# Patient Record
Sex: MALE | Race: WHITE | NOT HISPANIC OR LATINO | Employment: FULL TIME | URBAN - METROPOLITAN AREA
[De-identification: names, ages, dates, MRNs, and addresses within clinical notes are randomized per-mention and may not be internally consistent; named-entity substitution may affect disease eponyms.]

---

## 2018-03-30 ENCOUNTER — OFFICE VISIT (OUTPATIENT)
Dept: URGENT CARE | Facility: CLINIC | Age: 58
End: 2018-03-30
Payer: COMMERCIAL

## 2018-03-30 VITALS
SYSTOLIC BLOOD PRESSURE: 138 MMHG | RESPIRATION RATE: 16 BRPM | TEMPERATURE: 98.4 F | BODY MASS INDEX: 28.63 KG/M2 | HEART RATE: 84 BPM | HEIGHT: 70 IN | WEIGHT: 200 LBS | DIASTOLIC BLOOD PRESSURE: 78 MMHG

## 2018-03-30 DIAGNOSIS — J11.1 INFLUENZA: Primary | ICD-10-CM

## 2018-03-30 PROCEDURE — 99203 OFFICE O/P NEW LOW 30 MIN: CPT

## 2018-03-30 RX ORDER — OSELTAMIVIR PHOSPHATE 75 MG/1
75 CAPSULE ORAL EVERY 12 HOURS SCHEDULED
Qty: 10 CAPSULE | Refills: 0 | Status: SHIPPED | OUTPATIENT
Start: 2018-03-30 | End: 2018-04-04

## 2018-03-30 RX ORDER — LISINOPRIL 5 MG/1
5 TABLET ORAL DAILY
COMMUNITY

## 2018-03-30 RX ORDER — AMOXICILLIN 500 MG/1
500 CAPSULE ORAL EVERY 8 HOURS SCHEDULED
COMMUNITY
End: 2022-05-19 | Stop reason: HOSPADM

## 2018-03-30 RX ORDER — OSELTAMIVIR PHOSPHATE 75 MG/1
75 CAPSULE ORAL 2 TIMES DAILY
Qty: 10 CAPSULE | Refills: 0 | Status: SHIPPED | OUTPATIENT
Start: 2018-03-30 | End: 2018-03-30 | Stop reason: CLARIF

## 2018-03-30 NOTE — PROGRESS NOTES
[address]        NAME: Ryan Dorsey is a 65 y o  male  : 1960    MRN: [id number]  DATE: 2018  TIME: 1:27 PM    Assessment and Plan   Influenza [J11 1]  1  Influenza  oseltamivir (TAMIFLU) 75 mg capsule         Patient Instructions       Follow up with PCP in 3-5 days  Proceed to  ER if symptoms worsen  Chief Complaint     Chief Complaint   Patient presents with    Nasal Congestion     sinus congestion, coughing, achey, fatigue, fever x 1 week  Pt was seen tuesday at [facility] and was given Amoxicillin  Pt has no improvement  Pt took nightquil last night         History of Present Illness       10 days ago started with sore throat runny nose headache and dry cough with increased fatigue  Was feeling better 5 days ago and then 3-4 days ago had increasing fatigue, body aches, decreased appetite, fever, chills, headache, and dry cough  Has been taking amoxicillin for sinusitis for 3 days and states he feels no better  Denies any sinus pressure or congestion  No max or frontal tenderness  No ear popping  Review of Systems   Review of Systems   Constitutional: Positive for chills, fatigue and fever  HENT: Positive for postnasal drip, rhinorrhea, sneezing and sore throat  Negative for congestion and ear pain  Eyes: Negative  Negative for discharge and redness  Respiratory: Positive for cough  Negative for shortness of breath and wheezing  Cardiovascular: Negative  Negative for chest pain  Gastrointestinal: Negative  Negative for abdominal pain  Endocrine: Negative  Genitourinary: Negative  Musculoskeletal: Positive for myalgias  Negative for gait problem, neck pain and neck stiffness  Skin: Positive for pallor  Negative for color change, rash and wound  Allergic/Immunologic: Negative for environmental allergies  Neurological: Positive for headaches  Negative for dizziness, facial asymmetry and weakness     Psychiatric/Behavioral: Negative  Negative for behavioral problems, confusion and decreased concentration  Current Medications       Current Outpatient Prescriptions:     amoxicillin (AMOXIL) 500 mg capsule, Take 500 mg by mouth every 8 (eight) hours, Disp: , Rfl:     lisinopril (ZESTRIL) 5 mg tablet, Take 5 mg by mouth daily, Disp: , Rfl:     oseltamivir (TAMIFLU) 75 mg capsule, Take 1 capsule (75 mg total) by mouth 2 (two) times a day for 5 days, Disp: 10 capsule, Rfl: 0    Current Allergies     Allergies as of 03/30/2018 - never reviewed   Allergen Reaction Noted    Flagyl [metronidazole] Rash 03/30/2018            The following portions of the patient's history were reviewed and updated as appropriate: allergies, current medications, past family history, past medical history, past social history, past surgical history and problem list      No past medical history on file  No past surgical history on file  No family history on file  Medications have been verified          Objective   /78   Pulse 84   Temp 98 4 °F (36 9 °C)   Resp 16   Ht 5' 10" (1 778 m)   Wt 90 7 kg (200 lb)   BMI 28 70 kg/m²        Physical Exam     Physical Exam

## 2018-03-30 NOTE — PATIENT INSTRUCTIONS
Influenza   WHAT YOU NEED TO KNOW:   Influenza (the flu) is an infection caused by the influenza virus  The flu is easily spread when an infected person coughs, sneezes, or has close contact with others  You may be able to spread the flu to others for 1 week or longer after signs or symptoms appear  DISCHARGE INSTRUCTIONS:   Call 911 for any of the following:   · You have trouble breathing, and your lips look purple or blue  · You have a seizure  Return to the emergency department if:   · You are dizzy, or you are urinating less or not at all  · You have a headache with a stiff neck, and you feel tired or confused  · You have new pain or pressure in your chest     · Your symptoms, such as shortness of breath, vomiting, or diarrhea, get worse  · Your symptoms, such as fever and coughing, seem to get better, but then get worse  Contact your healthcare provider if:   · You have new muscle pain or weakness  · You have questions or concerns about your condition or care  Medicines: You may need any of the following:  · Acetaminophen  decreases pain and fever  It is available without a doctor's order  Ask how much to take and how often to take it  Follow directions  Acetaminophen can cause liver damage if not taken correctly  · NSAIDs , such as ibuprofen, help decrease swelling, pain, and fever  This medicine is available with or without a doctor's order  NSAIDs can cause stomach bleeding or kidney problems in certain people  If you take blood thinner medicine, always ask your healthcare provider if NSAIDs are safe for you  Always read the medicine label and follow directions  · Antivirals  help fight a viral infection  · Take your medicine as directed  Contact your healthcare provider if you think your medicine is not helping or if you have side effects  Tell him or her if you are allergic to any medicine  Keep a list of the medicines, vitamins, and herbs you take   Include the amounts, and when and why you take them  Bring the list or the pill bottles to follow-up visits  Carry your medicine list with you in case of an emergency  Rest  as much as you can to help you recover  Drink liquids as directed  to help prevent dehydration  Ask how much liquid to drink each day and which liquids are best for you  Prevent the spread of influenza:   · Wash your hands often  Use soap and water  Wash your hands after you use the bathroom, change a child's diapers, or sneeze  Wash your hands before you prepare or eat food  Use gel hand cleanser when soap and water are not available  Do not touch your eyes, nose, or mouth unless you have washed your hands first            · Cover your mouth when you sneeze or cough  Cough into a tissue or the bend of your arm  · Clean shared items with a germ-killing   Clean table surfaces, doorknobs, and light switches  Do not share towels, silverware, and dishes with people who are sick  Wash bed sheets, towels, silverware, and dishes with soap and water  · Wear a mask  over your mouth and nose if you are sick or are near anyone who is sick  · Stay away from others  if you are sick  · Influenza vaccine  helps prevent influenza (flu)  Everyone older than 6 months should get a yearly influenza vaccine  Get the vaccine as soon as it is available, usually in September or October each year  Follow up with your healthcare provider as directed:  Write down your questions so you remember to ask them during your visits  © 2017 [address] Information is for End User's use only and may not be sold, redistributed or otherwise used for commercial purposes  All illustrations and images included in CareNotes® are the copyrighted property of [employer]  or [employer]  The above information is an  only  It is not intended as medical advice for individual conditions or treatments   Talk to your doctor, nurse or pharmacist before following any medical regimen to see if it is safe and effective for you

## 2021-06-17 ENCOUNTER — APPOINTMENT (EMERGENCY)
Dept: RADIOLOGY | Facility: HOSPITAL | Age: 61
DRG: 982 | End: 2021-06-17
Payer: COMMERCIAL

## 2021-06-17 ENCOUNTER — DOCUMENTATION (OUTPATIENT)
Dept: NEUROSURGERY | Facility: CLINIC | Age: 61
End: 2021-06-17

## 2021-06-17 ENCOUNTER — APPOINTMENT (INPATIENT)
Dept: RADIOLOGY | Facility: HOSPITAL | Age: 61
DRG: 982 | End: 2021-06-17
Attending: PHYSICIAN ASSISTANT
Payer: COMMERCIAL

## 2021-06-17 ENCOUNTER — APPOINTMENT (INPATIENT)
Dept: RADIOLOGY | Facility: HOSPITAL | Age: 61
DRG: 982 | End: 2021-06-17
Attending: EMERGENCY MEDICINE
Payer: COMMERCIAL

## 2021-06-17 ENCOUNTER — HOSPITAL ENCOUNTER (INPATIENT)
Facility: HOSPITAL | Age: 61
LOS: 5 days | Discharge: HOME HEALTH | DRG: 982 | End: 2021-06-22
Attending: EMERGENCY MEDICINE | Admitting: SURGERY
Payer: COMMERCIAL

## 2021-06-17 DIAGNOSIS — S12.9XXA CLOSED FRACTURE OF MULTIPLE CERVICAL VERTEBRAE, INITIAL ENCOUNTER (CMS/HCC): ICD-10-CM

## 2021-06-17 DIAGNOSIS — S22.009A CLOSED FRACTURE OF MULTIPLE THORACIC VERTEBRAE, INITIAL ENCOUNTER (CMS/HCC): ICD-10-CM

## 2021-06-17 DIAGNOSIS — R11.2 NON-INTRACTABLE VOMITING WITH NAUSEA, UNSPECIFIED VOMITING TYPE: Primary | ICD-10-CM
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
## 2021-06-17 PROBLEM — S27.0XXA TRAUMATIC PNEUMOTHORAX: Status: ACTIVE | Noted: 2021-06-17

## 2021-06-17 PROBLEM — J98.2: Status: ACTIVE | Noted: 2021-06-17

## 2021-06-17 PROBLEM — S22.009D: Status: ACTIVE | Noted: 2021-06-17

## 2021-06-17 PROBLEM — S22.42XD CLOSED FRACTURE OF MULTIPLE RIBS OF LEFT SIDE WITH ROUTINE HEALING: Status: ACTIVE | Noted: 2021-06-17

## 2021-06-17 PROBLEM — S12.601D CLOSED NONDISPLACED FRACTURE OF SEVENTH CERVICAL VERTEBRA WITH ROUTINE HEALING: Status: ACTIVE | Noted: 2021-06-17

## 2021-06-17 LAB
ABO + RH BLD: NORMAL
ALBUMIN SERPL-MCNC: 4.4 G/DL (ref 3.4–5)
ALP SERPL-CCNC: 61 IU/L (ref 35–126)
ALT SERPL-CCNC: 29 IU/L (ref 16–63)
ANION GAP SERPL CALC-SCNC: 10 MEQ/L (ref 3–15)
APTT PPP: 29 SEC (ref 23–35)
AST SERPL-CCNC: 34 IU/L (ref 15–41)
BASOPHILS # BLD: 0.08 K/UL (ref 0.01–0.1)
BASOPHILS NFR BLD: 0.7 %
BILIRUB SERPL-MCNC: 0.9 MG/DL (ref 0.3–1.2)
BLD GP AB SCN SERPL QL: NEGATIVE
BUN SERPL-MCNC: 21 MG/DL (ref 8–20)
CALCIUM SERPL-MCNC: 8.9 MG/DL (ref 8.9–10.3)
CHLORIDE SERPL-SCNC: 101 MEQ/L (ref 98–109)
CO2 SERPL-SCNC: 25 MEQ/L (ref 22–32)
CREAT SERPL-MCNC: 1 MG/DL (ref 0.8–1.3)
D AG BLD QL: NEGATIVE
DIFFERENTIAL METHOD BLD: ABNORMAL
EOSINOPHIL # BLD: 0.26 K/UL (ref 0.04–0.54)
EOSINOPHIL NFR BLD: 2.2 %
ERYTHROCYTE [DISTWIDTH] IN BLOOD BY AUTOMATED COUNT: 12 % (ref 11.6–14.4)
GFR SERPL CREATININE-BSD FRML MDRD: >60 ML/MIN/1.73M*2
GLUCOSE SERPL-MCNC: 172 MG/DL (ref 70–99)
HCT VFR BLDCO AUTO: 46 % (ref 40.1–51)
HGB BLD-MCNC: 15.9 G/DL (ref 13.7–17.5)
IMM GRANULOCYTES # BLD AUTO: 0.15 K/UL (ref 0–0.08)
IMM GRANULOCYTES NFR BLD AUTO: 1.3 %
INR PPP: 1.1
LABORATORY COMMENT REPORT: NORMAL
LIPASE SERPL-CCNC: 31 U/L (ref 20–51)
LYMPHOCYTES # BLD: 3.26 K/UL (ref 1.2–3.5)
LYMPHOCYTES NFR BLD: 28.2 %
MCH RBC QN AUTO: 32 PG (ref 28–33.2)
MCHC RBC AUTO-ENTMCNC: 34.6 G/DL (ref 32.2–36.5)
MCV RBC AUTO: 92.6 FL (ref 83–98)
MONOCYTES # BLD: 0.83 K/UL (ref 0.3–1)
MONOCYTES NFR BLD: 7.2 %
NEUTROPHILS # BLD: 7 K/UL (ref 1.7–7)
NEUTS SEG NFR BLD: 60.4 %
NRBC BLD-RTO: 0 %
PDW BLD AUTO: 9.6 FL (ref 9.4–12.4)
PLATELET # BLD AUTO: 265 K/UL (ref 150–350)
POTASSIUM SERPL-SCNC: 3.9 MEQ/L (ref 3.6–5.1)
PROT SERPL-MCNC: 7.4 G/DL (ref 6–8.2)
PROTHROMBIN TIME: 14.1 SEC (ref 12.2–14.5)
RBC # BLD AUTO: 4.97 M/UL (ref 4.5–5.8)
SARS-COV-2 RNA RESP QL NAA+PROBE: NEGATIVE
SODIUM SERPL-SCNC: 136 MEQ/L (ref 136–144)
WBC # BLD AUTO: 11.58 K/UL (ref 3.8–10.5)

## 2021-06-17 PROCEDURE — 90715 TDAP VACCINE 7 YRS/> IM: CPT

## 2021-06-17 PROCEDURE — 63600105 HC IODINE BASED CONTRAST: Performed by: PHYSICIAN ASSISTANT

## 2021-06-17 PROCEDURE — 25800000 HC PHARMACY IV SOLUTIONS: Performed by: PHYSICIAN ASSISTANT

## 2021-06-17 PROCEDURE — 85730 THROMBOPLASTIN TIME PARTIAL: CPT | Performed by: EMERGENCY MEDICINE

## 2021-06-17 PROCEDURE — 63600105 HC IODINE BASED CONTRAST

## 2021-06-17 PROCEDURE — 85610 PROTHROMBIN TIME: CPT | Performed by: EMERGENCY MEDICINE

## 2021-06-17 PROCEDURE — 71045 X-RAY EXAM CHEST 1 VIEW: CPT

## 2021-06-17 PROCEDURE — G1004 CDSM NDSC: HCPCS

## 2021-06-17 PROCEDURE — 70450 CT HEAD/BRAIN W/O DYE: CPT | Mod: MG

## 2021-06-17 PROCEDURE — 73090 X-RAY EXAM OF FOREARM: CPT | Mod: RT

## 2021-06-17 PROCEDURE — 85610 PROTHROMBIN TIME: CPT

## 2021-06-17 PROCEDURE — 85025 COMPLETE CBC W/AUTO DIFF WBC: CPT

## 2021-06-17 PROCEDURE — 20600000 HC ROOM AND CARE INTERMEDIATE/TELEMETRY

## 2021-06-17 PROCEDURE — 99222 1ST HOSP IP/OBS MODERATE 55: CPT | Mod: 57 | Performed by: NEUROLOGICAL SURGERY

## 2021-06-17 PROCEDURE — 36415 COLL VENOUS BLD VENIPUNCTURE: CPT

## 2021-06-17 PROCEDURE — 63700000 HC SELF-ADMINISTRABLE DRUG: Performed by: PHYSICIAN ASSISTANT

## 2021-06-17 PROCEDURE — 99285 EMERGENCY DEPT VISIT HI MDM: CPT | Mod: 25

## 2021-06-17 PROCEDURE — 72146 MRI CHEST SPINE W/O DYE: CPT | Mod: MG

## 2021-06-17 PROCEDURE — 63600000 HC DRUGS/DETAIL CODE: Performed by: SURGERY

## 2021-06-17 PROCEDURE — 83690 ASSAY OF LIPASE: CPT | Performed by: EMERGENCY MEDICINE

## 2021-06-17 PROCEDURE — 90471 IMMUNIZATION ADMIN: CPT

## 2021-06-17 PROCEDURE — 68200000 HC TRAUMA RSPNS W/O CRIT CARE

## 2021-06-17 PROCEDURE — 96374 THER/PROPH/DIAG INJ IV PUSH: CPT | Mod: 59

## 2021-06-17 PROCEDURE — U0003 INFECTIOUS AGENT DETECTION BY NUCLEIC ACID (DNA OR RNA); SEVERE ACUTE RESPIRATORY SYNDROME CORONAVIRUS 2 (SARS-COV-2) (CORONAVIRUS DISEASE [COVID-19]), AMPLIFIED PROBE TECHNIQUE, MAKING USE OF HIGH THROUGHPUT TECHNOLOGIES AS DESCRIBED BY CMS-2020-01-R: HCPCS | Performed by: EMERGENCY MEDICINE

## 2021-06-17 PROCEDURE — 63600000 HC DRUGS/DETAIL CODE: Performed by: PHYSICIAN ASSISTANT

## 2021-06-17 PROCEDURE — 80053 COMPREHEN METABOLIC PANEL: CPT | Performed by: EMERGENCY MEDICINE

## 2021-06-17 PROCEDURE — 85025 COMPLETE CBC W/AUTO DIFF WBC: CPT | Performed by: EMERGENCY MEDICINE

## 2021-06-17 PROCEDURE — 74220 X-RAY XM ESOPHAGUS 1CNTRST: CPT

## 2021-06-17 PROCEDURE — 63700000 HC SELF-ADMINISTRABLE DRUG: Performed by: NEUROLOGICAL SURGERY

## 2021-06-17 PROCEDURE — 72170 X-RAY EXAM OF PELVIS: CPT

## 2021-06-17 PROCEDURE — 86901 BLOOD TYPING SEROLOGIC RH(D): CPT

## 2021-06-17 PROCEDURE — 85730 THROMBOPLASTIN TIME PARTIAL: CPT

## 2021-06-17 PROCEDURE — 73130 X-RAY EXAM OF HAND: CPT | Mod: RT

## 2021-06-17 PROCEDURE — 63600000 HC DRUGS/DETAIL CODE: Performed by: NEUROLOGICAL SURGERY

## 2021-06-17 PROCEDURE — 90472 IMMUNIZATION ADMIN EACH ADD: CPT | Performed by: EMERGENCY MEDICINE

## 2021-06-17 PROCEDURE — 70030 X-RAY EYE FOR FOREIGN BODY: CPT | Mod: 50

## 2021-06-17 PROCEDURE — 63600000 HC DRUGS/DETAIL CODE

## 2021-06-17 PROCEDURE — 73564 X-RAY EXAM KNEE 4 OR MORE: CPT | Mod: 50

## 2021-06-17 PROCEDURE — 74177 CT ABD & PELVIS W/CONTRAST: CPT | Mod: MG

## 2021-06-17 RX ORDER — KETOROLAC TROMETHAMINE 30 MG/ML
30 INJECTION, SOLUTION INTRAMUSCULAR; INTRAVENOUS EVERY 8 HOURS
Status: COMPLETED | OUTPATIENT
Start: 2021-06-17 | End: 2021-06-22

## 2021-06-17 RX ORDER — POTASSIUM CHLORIDE 14.9 MG/ML
20 INJECTION INTRAVENOUS AS NEEDED
Status: DISCONTINUED | OUTPATIENT
Start: 2021-06-17 | End: 2021-06-22 | Stop reason: HOSPADM

## 2021-06-17 RX ORDER — ACETAMINOPHEN 325 MG/1
975 TABLET ORAL EVERY 6 HOURS
Status: DISCONTINUED | OUTPATIENT
Start: 2021-06-17 | End: 2021-06-22 | Stop reason: HOSPADM

## 2021-06-17 RX ORDER — LISINOPRIL 5 MG/1
5 TABLET ORAL DAILY
COMMUNITY

## 2021-06-17 RX ORDER — SODIUM CHLORIDE 9 MG/ML
INJECTION, SOLUTION INTRAVENOUS CONTINUOUS
Status: DISCONTINUED | OUTPATIENT
Start: 2021-06-17 | End: 2021-06-20

## 2021-06-17 RX ORDER — IBUPROFEN 200 MG
16-32 TABLET ORAL AS NEEDED
Status: DISCONTINUED | OUTPATIENT
Start: 2021-06-17 | End: 2021-06-22 | Stop reason: HOSPADM

## 2021-06-17 RX ORDER — POTASSIUM CHLORIDE 750 MG/1
20 TABLET, FILM COATED, EXTENDED RELEASE ORAL AS NEEDED
Status: DISCONTINUED | OUTPATIENT
Start: 2021-06-17 | End: 2021-06-22 | Stop reason: HOSPADM

## 2021-06-17 RX ORDER — DIAZEPAM 5 MG/1
5 TABLET ORAL EVERY 6 HOURS PRN
Status: DISCONTINUED | OUTPATIENT
Start: 2021-06-17 | End: 2021-06-19

## 2021-06-17 RX ORDER — DEXTROSE 50 % IN WATER (D50W) INTRAVENOUS SYRINGE
25 AS NEEDED
Status: DISCONTINUED | OUTPATIENT
Start: 2021-06-17 | End: 2021-06-22 | Stop reason: HOSPADM

## 2021-06-17 RX ORDER — OXYCODONE HYDROCHLORIDE 5 MG/1
10 TABLET ORAL EVERY 4 HOURS PRN
Status: DISCONTINUED | OUTPATIENT
Start: 2021-06-17 | End: 2021-06-22 | Stop reason: HOSPADM

## 2021-06-17 RX ORDER — AMOXICILLIN 250 MG
1 CAPSULE ORAL 2 TIMES DAILY
Status: DISCONTINUED | OUTPATIENT
Start: 2021-06-17 | End: 2021-06-22 | Stop reason: HOSPADM

## 2021-06-17 RX ORDER — HYDROMORPHONE HYDROCHLORIDE 1 MG/ML
0.5 INJECTION, SOLUTION INTRAMUSCULAR; INTRAVENOUS; SUBCUTANEOUS
Status: DISCONTINUED | OUTPATIENT
Start: 2021-06-17 | End: 2021-06-17

## 2021-06-17 RX ORDER — OXYCODONE HYDROCHLORIDE 5 MG/1
15 TABLET ORAL EVERY 4 HOURS PRN
Status: DISCONTINUED | OUTPATIENT
Start: 2021-06-17 | End: 2021-06-22 | Stop reason: HOSPADM

## 2021-06-17 RX ORDER — POTASSIUM CHLORIDE 750 MG/1
40 TABLET, FILM COATED, EXTENDED RELEASE ORAL AS NEEDED
Status: DISCONTINUED | OUTPATIENT
Start: 2021-06-17 | End: 2021-06-22 | Stop reason: HOSPADM

## 2021-06-17 RX ORDER — HYDROMORPHONE HYDROCHLORIDE 1 MG/ML
1 INJECTION, SOLUTION INTRAMUSCULAR; INTRAVENOUS; SUBCUTANEOUS EVERY 2 HOUR PRN
Status: DISCONTINUED | OUTPATIENT
Start: 2021-06-17 | End: 2021-06-22 | Stop reason: HOSPADM

## 2021-06-17 RX ORDER — TAMSULOSIN HYDROCHLORIDE 0.4 MG/1
0.4 CAPSULE ORAL NIGHTLY
Status: DISCONTINUED | OUTPATIENT
Start: 2021-06-17 | End: 2021-06-22 | Stop reason: HOSPADM

## 2021-06-17 RX ORDER — ENOXAPARIN SODIUM 100 MG/ML
30 INJECTION SUBCUTANEOUS
Status: DISCONTINUED | OUTPATIENT
Start: 2021-06-17 | End: 2021-06-17

## 2021-06-17 RX ORDER — FENTANYL CITRATE 50 UG/ML
INJECTION, SOLUTION INTRAMUSCULAR; INTRAVENOUS
Status: COMPLETED | OUTPATIENT
Start: 2021-06-17 | End: 2021-06-17

## 2021-06-17 RX ORDER — LISINOPRIL 5 MG/1
5 TABLET ORAL DAILY
Status: DISCONTINUED | OUTPATIENT
Start: 2021-06-18 | End: 2021-06-22 | Stop reason: HOSPADM

## 2021-06-17 RX ORDER — DEXTROSE 40 %
15-30 GEL (GRAM) ORAL AS NEEDED
Status: DISCONTINUED | OUTPATIENT
Start: 2021-06-17 | End: 2021-06-22 | Stop reason: HOSPADM

## 2021-06-17 RX ADMIN — KETOROLAC TROMETHAMINE 30 MG: 30 INJECTION, SOLUTION INTRAMUSCULAR; INTRAVENOUS at 22:17

## 2021-06-17 RX ADMIN — IOHEXOL 125 ML: 300 INJECTION, SOLUTION INTRAVENOUS at 08:28

## 2021-06-17 RX ADMIN — FENTANYL CITRATE 100 MCG: 50 INJECTION INTRAMUSCULAR; INTRAVENOUS at 08:07

## 2021-06-17 RX ADMIN — HYDROMORPHONE HYDROCHLORIDE 0.5 MG: 1 INJECTION, SOLUTION INTRAMUSCULAR; INTRAVENOUS; SUBCUTANEOUS at 10:29

## 2021-06-17 RX ADMIN — ACETAMINOPHEN 975 MG: 325 TABLET, FILM COATED ORAL at 23:50

## 2021-06-17 RX ADMIN — HYDROMORPHONE HYDROCHLORIDE 0.5 MG: 1 INJECTION, SOLUTION INTRAMUSCULAR; INTRAVENOUS; SUBCUTANEOUS at 15:48

## 2021-06-17 RX ADMIN — ENOXAPARIN SODIUM 30 MG: 100 INJECTION SUBCUTANEOUS at 17:42

## 2021-06-17 RX ADMIN — DOCUSATE SODIUM AND SENNOSIDES 1 TABLET: 50; 8.6 TABLET ORAL at 20:18

## 2021-06-17 RX ADMIN — SODIUM CHLORIDE: 9 INJECTION, SOLUTION INTRAVENOUS at 17:22

## 2021-06-17 RX ADMIN — IOHEXOL 100 ML: 300 INJECTION, SOLUTION INTRAVENOUS at 16:46

## 2021-06-17 RX ADMIN — TAMSULOSIN HYDROCHLORIDE 0.4 MG: 0.4 CAPSULE ORAL at 20:18

## 2021-06-17 RX ADMIN — TETANUS TOXOID, REDUCED DIPHTHERIA TOXOID AND ACELLULAR PERTUSSIS VACCINE, ADSORBED 0.5 ML: 5; 2.5; 8; 8; 2.5 SUSPENSION INTRAMUSCULAR at 09:28

## 2021-06-17 RX ADMIN — HYDROMORPHONE HYDROCHLORIDE 1 MG: 1 INJECTION, SOLUTION INTRAMUSCULAR; INTRAVENOUS; SUBCUTANEOUS at 20:18

## 2021-06-17 RX ADMIN — ACETAMINOPHEN 975 MG: 325 TABLET, FILM COATED ORAL at 17:41

## 2021-06-17 RX ADMIN — DIAZEPAM 5 MG: 5 TABLET ORAL at 20:18

## 2021-06-17 ASSESSMENT — ENCOUNTER SYMPTOMS
CONSTITUTIONAL NEGATIVE: 1
HEMATOLOGIC/LYMPHATIC NEGATIVE: 1
DYSURIA: 0
NAUSEA: 0
LIGHT-HEADEDNESS: 0
ARTHRALGIAS: 0
COUGH: 0
WEAKNESS: 0
FLANK PAIN: 0
NEUROLOGICAL NEGATIVE: 1
FEVER: 0
MUSCULOSKELETAL NEGATIVE: 1
RESPIRATORY NEGATIVE: 1
CARDIOVASCULAR NEGATIVE: 1
NUMBNESS: 0
SHORTNESS OF BREATH: 0
MYALGIAS: 0
CHILLS: 0
BRUISES/BLEEDS EASILY: 0
ENDOCRINE NEGATIVE: 1
EYES NEGATIVE: 1
PALPITATIONS: 0
VOMITING: 0
RHINORRHEA: 0
ABDOMINAL PAIN: 0
DIZZINESS: 0
GASTROINTESTINAL NEGATIVE: 1

## 2021-06-17 ASSESSMENT — COGNITIVE AND FUNCTIONAL STATUS - GENERAL
HELP NEEDED FOR PERSONAL GROOMING: 2 - A LOT
CLIMB 3 TO 5 STEPS WITH RAILING: 1 - TOTAL
DRESSING REGULAR UPPER BODY CLOTHING: 2 - A LOT
EATING MEALS: 2 - A LOT
TOILETING: 2 - A LOT
HELP NEEDED FOR BATHING: 2 - A LOT
DRESSING REGULAR LOWER BODY CLOTHING: 2 - A LOT
STANDING UP FROM CHAIR USING ARMS: 1 - TOTAL
MOVING TO AND FROM BED TO CHAIR: 1 - TOTAL
WALKING IN HOSPITAL ROOM: 1 - TOTAL

## 2021-06-17 ASSESSMENT — PATIENT HEALTH QUESTIONNAIRE - PHQ9: SUM OF ALL RESPONSES TO PHQ9 QUESTIONS 1 & 2: 0

## 2021-06-17 NOTE — ASSESSMENT & PLAN NOTE
Impression: Other vitreous opacities, bilateral Plan: Pt notes cloud like appearance and floaters OU intermittently and feels this has worsened over past 6 mos. Retina exam appears stable. Signs and symptoms of RD reviewed. RTC STAT if any increased in floaters or loss of VA. See mac oct for findings. Discussed PPVIT briefly. Recommend consult w Retina Team to discuss further and if pt is a good candidate. Small post traumatic R hemo/pnx  6/18 - no pneumo / pleural effusion appreciated on x ray   cxr this am

## 2021-06-17 NOTE — ASSESSMENT & PLAN NOTE
Spine injury     MRI -    1.  Redemonstration of a comminuted fracture extending to the right lamina, facet and transverse process of C7.  The fracture extends to both the superiorand inferior articular facets of C7.  2.  Small epidural hematoma extending from the level of C5-C6 to the level of C7-T1.  3.  Edema in the interspinous ligaments at C3-C4, C5-C6, T1-T2, T2-T3 and atT8-T9.  4.  Fracture through the superior endplate of T9 extending into the T8-T9  intervertebral disc.  Mild widening of the T8-T9 intervertebral disc space anteriorly associated focal disruption of the anterior longitudinal ligament.  5.  Mild paravertebral soft tissue edema.  6.  Additional chronic and incidental findings, as above.
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
## 2021-06-18 ENCOUNTER — APPOINTMENT (INPATIENT)
Dept: RADIOLOGY | Facility: HOSPITAL | Age: 61
DRG: 982 | End: 2021-06-18
Attending: PHYSICIAN ASSISTANT
Payer: COMMERCIAL

## 2021-06-18 ENCOUNTER — APPOINTMENT (INPATIENT)
Dept: RADIOLOGY | Facility: HOSPITAL | Age: 61
DRG: 982 | End: 2021-06-18
Attending: NEUROLOGICAL SURGERY
Payer: COMMERCIAL

## 2021-06-18 ENCOUNTER — ANESTHESIA (INPATIENT)
Dept: OPERATING ROOM | Facility: HOSPITAL | Age: 61
DRG: 982 | End: 2021-06-18
Payer: COMMERCIAL

## 2021-06-18 ENCOUNTER — ANESTHESIA EVENT (INPATIENT)
Dept: OPERATING ROOM | Facility: HOSPITAL | Age: 61
DRG: 982 | End: 2021-06-18
Payer: COMMERCIAL

## 2021-06-18 LAB
ABO + RH BLD: NORMAL
ANION GAP SERPL CALC-SCNC: 11 MEQ/L (ref 3–15)
BUN SERPL-MCNC: 23 MG/DL (ref 8–20)
CALCIUM SERPL-MCNC: 8.9 MG/DL (ref 8.9–10.3)
CHLORIDE SERPL-SCNC: 101 MEQ/L (ref 98–109)
CO2 SERPL-SCNC: 24 MEQ/L (ref 22–32)
CREAT SERPL-MCNC: 1 MG/DL (ref 0.8–1.3)
D AG BLD QL: NEGATIVE
ERYTHROCYTE [DISTWIDTH] IN BLOOD BY AUTOMATED COUNT: 12.5 % (ref 11.6–14.4)
GFR SERPL CREATININE-BSD FRML MDRD: >60 ML/MIN/1.73M*2
GLUCOSE SERPL-MCNC: 95 MG/DL (ref 70–99)
HCT VFR BLDCO AUTO: 41.2 % (ref 40.1–51)
HGB BLD-MCNC: 14.5 G/DL (ref 13.7–17.5)
LABORATORY COMMENT REPORT: NORMAL
MAGNESIUM SERPL-MCNC: 2.2 MG/DL (ref 1.8–2.5)
MCH RBC QN AUTO: 33.2 PG (ref 28–33.2)
MCHC RBC AUTO-ENTMCNC: 35.2 G/DL (ref 32.2–36.5)
MCV RBC AUTO: 94.3 FL (ref 83–98)
PDW BLD AUTO: 9.6 FL (ref 9.4–12.4)
PLATELET # BLD AUTO: 202 K/UL (ref 150–350)
POTASSIUM SERPL-SCNC: 4.1 MEQ/L (ref 3.6–5.1)
RBC # BLD AUTO: 4.37 M/UL (ref 4.5–5.8)
SODIUM SERPL-SCNC: 136 MEQ/L (ref 136–144)
WBC # BLD AUTO: 11.96 K/UL (ref 3.8–10.5)

## 2021-06-18 PROCEDURE — 25800000 HC PHARMACY IV SOLUTIONS: Performed by: NEUROLOGICAL SURGERY

## 2021-06-18 PROCEDURE — 63700000 HC SELF-ADMINISTRABLE DRUG: Performed by: NEUROLOGICAL SURGERY

## 2021-06-18 PROCEDURE — 25000000 HC PHARMACY GENERAL: Performed by: NURSE ANESTHETIST, CERTIFIED REGISTERED

## 2021-06-18 PROCEDURE — 22610 ARTHRD PST TQ 1NTRSPC THRC: CPT | Performed by: NEUROLOGICAL SURGERY

## 2021-06-18 PROCEDURE — 22327 TREAT THORAX SPINE FRACTURE: CPT | Performed by: NEUROLOGICAL SURGERY

## 2021-06-18 PROCEDURE — 36415 COLL VENOUS BLD VENIPUNCTURE: CPT | Performed by: PHYSICIAN ASSISTANT

## 2021-06-18 PROCEDURE — 20600000 HC ROOM AND CARE INTERMEDIATE/TELEMETRY

## 2021-06-18 PROCEDURE — 0RG7071 FUSION OF 2 TO 7 THORACIC VERTEBRAL JOINTS WITH AUTOLOGOUS TISSUE SUBSTITUTE, POSTERIOR APPROACH, POSTERIOR COLUMN, OPEN APPROACH: ICD-10-PCS | Performed by: NEUROLOGICAL SURGERY

## 2021-06-18 PROCEDURE — 22614 ARTHRD PST TQ 1NTRSPC EA ADD: CPT | Mod: AS | Performed by: PHYSICIAN ASSISTANT

## 2021-06-18 PROCEDURE — 71000001 HC PACU PHASE 1 INITIAL 30MIN: Performed by: NEUROLOGICAL SURGERY

## 2021-06-18 PROCEDURE — 0PS404Z REPOSITION THORACIC VERTEBRA WITH INTERNAL FIXATION DEVICE, OPEN APPROACH: ICD-10-PCS | Performed by: NEUROLOGICAL SURGERY

## 2021-06-18 PROCEDURE — 27800000 HC SUPPLY/IMPLANTS: Performed by: NEUROLOGICAL SURGERY

## 2021-06-18 PROCEDURE — 63600000 HC DRUGS/DETAIL CODE: Performed by: NURSE ANESTHETIST, CERTIFIED REGISTERED

## 2021-06-18 PROCEDURE — 25800000 HC PHARMACY IV SOLUTIONS: Performed by: ANESTHESIOLOGY

## 2021-06-18 PROCEDURE — 20936 SP BONE AGRFT LOCAL ADD-ON: CPT | Performed by: NEUROLOGICAL SURGERY

## 2021-06-18 PROCEDURE — 25000000 HC PHARMACY GENERAL: Performed by: NEUROLOGICAL SURGERY

## 2021-06-18 PROCEDURE — 63700000 HC SELF-ADMINISTRABLE DRUG: Performed by: PHYSICIAN ASSISTANT

## 2021-06-18 PROCEDURE — 22842 INSERT SPINE FIXATION DEVICE: CPT | Mod: AS | Performed by: PHYSICIAN ASSISTANT

## 2021-06-18 PROCEDURE — 22327 TREAT THORAX SPINE FRACTURE: CPT | Mod: AS | Performed by: PHYSICIAN ASSISTANT

## 2021-06-18 PROCEDURE — 22610 ARTHRD PST TQ 1NTRSPC THRC: CPT | Mod: AS | Performed by: PHYSICIAN ASSISTANT

## 2021-06-18 PROCEDURE — 37000001 HC ANESTHESIA GENERAL: Performed by: NEUROLOGICAL SURGERY

## 2021-06-18 PROCEDURE — 63600000 HC DRUGS/DETAIL CODE: Performed by: ANESTHESIOLOGY

## 2021-06-18 PROCEDURE — 25000000 HC PHARMACY GENERAL: Performed by: ANESTHESIOLOGY

## 2021-06-18 PROCEDURE — 83735 ASSAY OF MAGNESIUM: CPT | Performed by: PHYSICIAN ASSISTANT

## 2021-06-18 PROCEDURE — 63600000 HC DRUGS/DETAIL CODE: Performed by: NEUROLOGICAL SURGERY

## 2021-06-18 PROCEDURE — 20930 SP BONE ALGRFT MORSEL ADD-ON: CPT | Performed by: NEUROLOGICAL SURGERY

## 2021-06-18 PROCEDURE — 85027 COMPLETE CBC AUTOMATED: CPT | Performed by: PHYSICIAN ASSISTANT

## 2021-06-18 PROCEDURE — 61783 SCAN PROC SPINAL: CPT | Performed by: NEUROLOGICAL SURGERY

## 2021-06-18 PROCEDURE — C1713 ANCHOR/SCREW BN/BN,TIS/BN: HCPCS | Performed by: NEUROLOGICAL SURGERY

## 2021-06-18 PROCEDURE — 27200000 HC STERILE SUPPLY: Performed by: NEUROLOGICAL SURGERY

## 2021-06-18 PROCEDURE — 22842 INSERT SPINE FIXATION DEVICE: CPT | Performed by: NEUROLOGICAL SURGERY

## 2021-06-18 PROCEDURE — 36000014 HC OR LEVEL 4 EA ADDL MIN: Performed by: NEUROLOGICAL SURGERY

## 2021-06-18 PROCEDURE — 22614 ARTHRD PST TQ 1NTRSPC EA ADD: CPT | Performed by: NEUROLOGICAL SURGERY

## 2021-06-18 PROCEDURE — 25800000 HC PHARMACY IV SOLUTIONS: Performed by: NURSE ANESTHETIST, CERTIFIED REGISTERED

## 2021-06-18 PROCEDURE — 73120 X-RAY EXAM OF HAND: CPT | Mod: LT

## 2021-06-18 PROCEDURE — 72070 X-RAY EXAM THORAC SPINE 2VWS: CPT

## 2021-06-18 PROCEDURE — 80048 BASIC METABOLIC PNL TOTAL CA: CPT | Performed by: PHYSICIAN ASSISTANT

## 2021-06-18 PROCEDURE — 71000011 HC PACU PHASE 1 EA ADDL MIN: Performed by: NEUROLOGICAL SURGERY

## 2021-06-18 PROCEDURE — 63600000 HC DRUGS/DETAIL CODE: Mod: JW | Performed by: NURSE ANESTHETIST, CERTIFIED REGISTERED

## 2021-06-18 PROCEDURE — 36000004 HC OR LEVEL 4 INITIAL 30MIN: Performed by: NEUROLOGICAL SURGERY

## 2021-06-18 DEVICE — PASTE GRAFTON PLUS 10CC: Type: IMPLANTABLE DEVICE | Site: SPINE LUMBAR | Status: FUNCTIONAL

## 2021-06-18 DEVICE — SET SCREWS EXPEDIUM: Type: IMPLANTABLE DEVICE | Site: SPINE LUMBAR | Status: FUNCTIONAL

## 2021-06-18 DEVICE — BONE CANC CRUSHED 15CC (1-10MM) FREEZE DRIED: Type: IMPLANTABLE DEVICE | Site: SPINE LUMBAR | Status: FUNCTIONAL

## 2021-06-18 DEVICE — IMPLANTABLE DEVICE: Type: IMPLANTABLE DEVICE | Site: SPINE LUMBAR | Status: FUNCTIONAL

## 2021-06-18 DEVICE — SCREW POLY 5 X 45: Type: IMPLANTABLE DEVICE | Site: SPINE LUMBAR | Status: FUNCTIONAL

## 2021-06-18 DEVICE — ROD EXPEDIUM 115MM: Type: IMPLANTABLE DEVICE | Site: SPINE LUMBAR | Status: FUNCTIONAL

## 2021-06-18 DEVICE — MASTERGRAFT MATRIX 20CC: Type: IMPLANTABLE DEVICE | Site: SPINE LUMBAR | Status: FUNCTIONAL

## 2021-06-18 DEVICE — INFUSE BONE GRAFT MEDIUM KIT: Type: IMPLANTABLE DEVICE | Site: SPINE LUMBAR | Status: FUNCTIONAL

## 2021-06-18 DEVICE — SCREW EXPEDIUM 6 X 45MM: Type: IMPLANTABLE DEVICE | Site: SPINE LUMBAR | Status: FUNCTIONAL

## 2021-06-18 RX ORDER — DEXTROSE 40 %
15-30 GEL (GRAM) ORAL AS NEEDED
Status: DISCONTINUED | OUTPATIENT
Start: 2021-06-18 | End: 2021-06-18 | Stop reason: HOSPADM

## 2021-06-18 RX ORDER — FENTANYL CITRATE 50 UG/ML
INJECTION, SOLUTION INTRAMUSCULAR; INTRAVENOUS AS NEEDED
Status: DISCONTINUED | OUTPATIENT
Start: 2021-06-18 | End: 2021-06-18 | Stop reason: SURG

## 2021-06-18 RX ORDER — PROPOFOL 10 MG/ML
INJECTION, EMULSION INTRAVENOUS CONTINUOUS PRN
Status: DISCONTINUED | OUTPATIENT
Start: 2021-06-18 | End: 2021-06-18 | Stop reason: SURG

## 2021-06-18 RX ORDER — ONDANSETRON HYDROCHLORIDE 2 MG/ML
INJECTION, SOLUTION INTRAVENOUS AS NEEDED
Status: DISCONTINUED | OUTPATIENT
Start: 2021-06-18 | End: 2021-06-18 | Stop reason: SURG

## 2021-06-18 RX ORDER — HYDROMORPHONE HYDROCHLORIDE 1 MG/ML
0.5 INJECTION, SOLUTION INTRAMUSCULAR; INTRAVENOUS; SUBCUTANEOUS
Status: DISCONTINUED | OUTPATIENT
Start: 2021-06-18 | End: 2021-06-18 | Stop reason: HOSPADM

## 2021-06-18 RX ORDER — VANCOMYCIN HYDROCHLORIDE 1 G/20ML
INJECTION, POWDER, LYOPHILIZED, FOR SOLUTION INTRAVENOUS AS NEEDED
Status: DISCONTINUED | OUTPATIENT
Start: 2021-06-18 | End: 2021-06-18 | Stop reason: HOSPADM

## 2021-06-18 RX ORDER — IBUPROFEN 200 MG
16-32 TABLET ORAL AS NEEDED
Status: DISCONTINUED | OUTPATIENT
Start: 2021-06-18 | End: 2021-06-18 | Stop reason: HOSPADM

## 2021-06-18 RX ORDER — ROCURONIUM BROMIDE 10 MG/ML
INJECTION, SOLUTION INTRAVENOUS AS NEEDED
Status: DISCONTINUED | OUTPATIENT
Start: 2021-06-18 | End: 2021-06-18 | Stop reason: SURG

## 2021-06-18 RX ORDER — PHENYLEPHRINE HCL IN 0.9% NACL 50MG/250ML
PLASTIC BAG, INJECTION (ML) INTRAVENOUS CONTINUOUS PRN
Status: DISCONTINUED | OUTPATIENT
Start: 2021-06-18 | End: 2021-06-18 | Stop reason: SURG

## 2021-06-18 RX ORDER — HYDROMORPHONE HYDROCHLORIDE 1 MG/ML
INJECTION, SOLUTION INTRAMUSCULAR; INTRAVENOUS; SUBCUTANEOUS AS NEEDED
Status: DISCONTINUED | OUTPATIENT
Start: 2021-06-18 | End: 2021-06-18 | Stop reason: SURG

## 2021-06-18 RX ORDER — FENTANYL CITRATE 50 UG/ML
50 INJECTION, SOLUTION INTRAMUSCULAR; INTRAVENOUS
Status: DISCONTINUED | OUTPATIENT
Start: 2021-06-18 | End: 2021-06-18 | Stop reason: HOSPADM

## 2021-06-18 RX ORDER — ONDANSETRON HYDROCHLORIDE 2 MG/ML
4 INJECTION, SOLUTION INTRAVENOUS
Status: DISCONTINUED | OUTPATIENT
Start: 2021-06-18 | End: 2021-06-18 | Stop reason: HOSPADM

## 2021-06-18 RX ORDER — PROPOFOL 10 MG/ML
INJECTION, EMULSION INTRAVENOUS AS NEEDED
Status: DISCONTINUED | OUTPATIENT
Start: 2021-06-18 | End: 2021-06-18 | Stop reason: SURG

## 2021-06-18 RX ORDER — BUPIVACAINE HCL/EPINEPHRINE 0.5-1:200K
VIAL (ML) INJECTION AS NEEDED
Status: DISCONTINUED | OUTPATIENT
Start: 2021-06-18 | End: 2021-06-18 | Stop reason: HOSPADM

## 2021-06-18 RX ORDER — MIDAZOLAM HYDROCHLORIDE 2 MG/2ML
INJECTION, SOLUTION INTRAMUSCULAR; INTRAVENOUS AS NEEDED
Status: DISCONTINUED | OUTPATIENT
Start: 2021-06-18 | End: 2021-06-18 | Stop reason: SURG

## 2021-06-18 RX ORDER — ACETAMINOPHEN 325 MG/1
650 TABLET ORAL ONCE AS NEEDED
Status: DISCONTINUED | OUTPATIENT
Start: 2021-06-18 | End: 2021-06-18 | Stop reason: HOSPADM

## 2021-06-18 RX ORDER — LIDOCAINE HYDROCHLORIDE 10 MG/ML
INJECTION, SOLUTION INFILTRATION; PERINEURAL AS NEEDED
Status: DISCONTINUED | OUTPATIENT
Start: 2021-06-18 | End: 2021-06-18 | Stop reason: SURG

## 2021-06-18 RX ORDER — SODIUM CHLORIDE, SODIUM GLUCONATE, SODIUM ACETATE, POTASSIUM CHLORIDE AND MAGNESIUM CHLORIDE 30; 37; 368; 526; 502 MG/100ML; MG/100ML; MG/100ML; MG/100ML; MG/100ML
INJECTION, SOLUTION INTRAVENOUS CONTINUOUS PRN
Status: DISCONTINUED | OUTPATIENT
Start: 2021-06-18 | End: 2021-06-18 | Stop reason: SURG

## 2021-06-18 RX ORDER — DEXTROSE 50 % IN WATER (D50W) INTRAVENOUS SYRINGE
25 AS NEEDED
Status: DISCONTINUED | OUTPATIENT
Start: 2021-06-18 | End: 2021-06-18 | Stop reason: HOSPADM

## 2021-06-18 RX ORDER — DEXAMETHASONE SODIUM PHOSPHATE 4 MG/ML
INJECTION, SOLUTION INTRA-ARTICULAR; INTRALESIONAL; INTRAMUSCULAR; INTRAVENOUS; SOFT TISSUE AS NEEDED
Status: DISCONTINUED | OUTPATIENT
Start: 2021-06-18 | End: 2021-06-18 | Stop reason: SURG

## 2021-06-18 RX ORDER — MEPERIDINE HYDROCHLORIDE 25 MG/ML
12.5 INJECTION INTRAMUSCULAR; INTRAVENOUS; SUBCUTANEOUS EVERY 10 MIN PRN
Status: DISCONTINUED | OUTPATIENT
Start: 2021-06-18 | End: 2021-06-22 | Stop reason: HOSPADM

## 2021-06-18 RX ORDER — OXYCODONE HYDROCHLORIDE 5 MG/1
5 TABLET ORAL ONCE AS NEEDED
Status: DISCONTINUED | OUTPATIENT
Start: 2021-06-18 | End: 2021-06-18 | Stop reason: HOSPADM

## 2021-06-18 RX ADMIN — PHENYLEPHRINE HYDROCHLORIDE 25 MCG/MIN: 10 INJECTION INTRAVENOUS at 16:57

## 2021-06-18 RX ADMIN — HYDROMORPHONE HYDROCHLORIDE 1 MG: 1 INJECTION, SOLUTION INTRAMUSCULAR; INTRAVENOUS; SUBCUTANEOUS at 03:42

## 2021-06-18 RX ADMIN — ONDANSETRON 4 MG: 2 INJECTION INTRAMUSCULAR; INTRAVENOUS at 18:31

## 2021-06-18 RX ADMIN — DOCUSATE SODIUM AND SENNOSIDES 1 TABLET: 50; 8.6 TABLET ORAL at 22:18

## 2021-06-18 RX ADMIN — KETOROLAC TROMETHAMINE 30 MG: 30 INJECTION, SOLUTION INTRAMUSCULAR; INTRAVENOUS at 22:18

## 2021-06-18 RX ADMIN — SUGAMMADEX 200 MG: 100 INJECTION, SOLUTION INTRAVENOUS at 17:19

## 2021-06-18 RX ADMIN — KETOROLAC TROMETHAMINE 30 MG: 30 INJECTION, SOLUTION INTRAMUSCULAR; INTRAVENOUS at 13:41

## 2021-06-18 RX ADMIN — SUCCINYLCHOLINE CHLORIDE 140 MG: 20 INJECTION, SOLUTION INTRAMUSCULAR; INTRAVENOUS; PARENTERAL at 16:01

## 2021-06-18 RX ADMIN — SODIUM CHLORIDE, SODIUM GLUCONATE, SODIUM ACETATE, POTASSIUM CHLORIDE AND MAGNESIUM CHLORIDE: 526; 502; 368; 37; 30 INJECTION, SOLUTION INTRAVENOUS at 16:15

## 2021-06-18 RX ADMIN — FENTANYL CITRATE 50 MCG: 50 INJECTION, SOLUTION INTRAMUSCULAR; INTRAVENOUS at 20:30

## 2021-06-18 RX ADMIN — PROPOFOL 175 MCG/KG/MIN: 10 INJECTION, EMULSION INTRAVENOUS at 16:04

## 2021-06-18 RX ADMIN — TAMSULOSIN HYDROCHLORIDE 0.4 MG: 0.4 CAPSULE ORAL at 22:18

## 2021-06-18 RX ADMIN — KETOROLAC TROMETHAMINE 30 MG: 30 INJECTION, SOLUTION INTRAMUSCULAR; INTRAVENOUS at 06:07

## 2021-06-18 RX ADMIN — DEXAMETHASONE SODIUM PHOSPHATE 10 MG: 4 INJECTION, SOLUTION INTRA-ARTICULAR; INTRALESIONAL; INTRAMUSCULAR; INTRAVENOUS; SOFT TISSUE at 16:20

## 2021-06-18 RX ADMIN — PROPOFOL 50 MG: 10 INJECTION, EMULSION INTRAVENOUS at 16:13

## 2021-06-18 RX ADMIN — CEFAZOLIN 2 G: 10 INJECTION, POWDER, FOR SOLUTION INTRAVENOUS at 22:12

## 2021-06-18 RX ADMIN — HYDROMORPHONE HYDROCHLORIDE 0.5 MG: 1 INJECTION, SOLUTION INTRAMUSCULAR; INTRAVENOUS; SUBCUTANEOUS at 19:39

## 2021-06-18 RX ADMIN — LIDOCAINE HYDROCHLORIDE 5 ML: 10 INJECTION, SOLUTION INFILTRATION; PERINEURAL at 16:01

## 2021-06-18 RX ADMIN — FENTANYL CITRATE 50 MCG: 50 INJECTION, SOLUTION INTRAMUSCULAR; INTRAVENOUS at 20:02

## 2021-06-18 RX ADMIN — SODIUM CHLORIDE: 9 INJECTION, SOLUTION INTRAVENOUS at 22:00

## 2021-06-18 RX ADMIN — ACETAMINOPHEN 975 MG: 325 TABLET, FILM COATED ORAL at 06:07

## 2021-06-18 RX ADMIN — HYDROMORPHONE HYDROCHLORIDE 0.5 MG: 1 INJECTION, SOLUTION INTRAMUSCULAR; INTRAVENOUS; SUBCUTANEOUS at 18:54

## 2021-06-18 RX ADMIN — ROCURONIUM BROMIDE 10 MG: 10 INJECTION INTRAVENOUS at 17:01

## 2021-06-18 RX ADMIN — MEPERIDINE HYDROCHLORIDE 12.5 MG: 25 INJECTION INTRAMUSCULAR; INTRAVENOUS; SUBCUTANEOUS at 19:58

## 2021-06-18 RX ADMIN — FENTANYL CITRATE 100 MCG: 50 INJECTION INTRAMUSCULAR; INTRAVENOUS at 16:01

## 2021-06-18 RX ADMIN — ROCURONIUM BROMIDE 20 MG: 10 INJECTION INTRAVENOUS at 16:45

## 2021-06-18 RX ADMIN — MIDAZOLAM HYDROCHLORIDE 2 MG: 1 INJECTION, SOLUTION INTRAMUSCULAR; INTRAVENOUS at 15:52

## 2021-06-18 RX ADMIN — REMIFENTANIL HYDROCHLORIDE 0.2 MCG/KG/MIN: 1 INJECTION, POWDER, LYOPHILIZED, FOR SOLUTION INTRAVENOUS at 16:04

## 2021-06-18 RX ADMIN — PROPOFOL 200 MG: 10 INJECTION, EMULSION INTRAVENOUS at 16:01

## 2021-06-18 RX ADMIN — CEFAZOLIN 2 G: 330 INJECTION, POWDER, FOR SOLUTION INTRAMUSCULAR; INTRAVENOUS at 16:20

## 2021-06-18 ASSESSMENT — PAIN SCALES - GENERAL: PAIN_LEVEL: 2

## 2021-06-18 NOTE — INTERVAL H&P NOTE
Addendum on day of surgery:  I have evaluated the patient.  I have reviewed the H & P confirmed there is no change in exam or symptoms.  I have reviewed the patient's imaging again.  I have identified and marked the patient.  We will proceed with the above surgery as planned.

## 2021-06-18 NOTE — ANESTHESIOLOGIST PRE-PROCEDURE ATTESTATION
Pre-Procedure Patient Identification:  I am the Primary Anesthesiologist and have identified the patient on 06/18/21 at 3:14 PM.   I have confirmed the following procedure(s) T7 to T10 posterolateral instrumented fusion  Depuy-Synthes O-Arm SMA will be performed by the following surgeon/proceduralist Shyam Howell MD.
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
## 2021-06-18 NOTE — PLAN OF CARE
Problem: Adult Inpatient Plan of Care  Goal: Plan of Care Review  Outcome: Progressing  Flowsheets (Taken 6/18/2021 2482)  Progress: improving  Plan of Care Reviewed With: patient  Outcome Summary: VSS, on room air, afebrile. Aspen collar on at all times. Strict bedrest, log rolling. C/o mid back pain and neck pain. No numbness or tingling. Continues to have decreased strength in RUE.  Continuing on IVF. Voiding into urinal. Plan for OR this afternoon, T7-T10 fusion. NPO since midnight. Labs pending.

## 2021-06-18 NOTE — ANESTHESIA POSTPROCEDURE EVALUATION
Patient: Ruth SANZ [place]    Procedure Summary     Date: 06/18/21 Room / Location:  OR 10 / PH OR    Anesthesia Start: 1556 Anesthesia Stop: 1942    Procedure: T7 to T10 posterolateral instrumented fusion  Depuy-Synthes O-Arm SMA (N/A Back) Diagnosis:       Other closed fracture of ninth thoracic vertebra, initial encounter (CMS/[facility])      (Other closed fracture of ninth thoracic vertebra, initial encounter (CMS/[facility]) [S22.408J])    Surgeons: Shyam Howell MD Responsible Provider: Robbie Li MD    Anesthesia Type: general ASA Status: 3          Anesthesia Type: general  PACU Vitals  6/18/2021 1932 - 6/18/2021 1949 6/18/2021 1940 6/18/2021 1945          BP:  (!) 169/85  (!) 166/88      Temp:  36.3 °C (97.4 °F)  --      Pulse:  98  98      Resp:  16  (!) 21      SpO2:  94 %  94 %              Anesthesia Post Evaluation    Pain score: 2  Pain management: adequate  Patient location during evaluation: PACU  Patient participation: complete - patient participated  Level of consciousness: awake and alert  Cardiovascular status: acceptable  Airway Patency: adequate  Respiratory status: acceptable  Hydration status: acceptable  Anesthetic complications: no

## 2021-06-18 NOTE — OP NOTE
OPERATIVE REPORT    PREOPERATIVE DIAGNOSES:  [default value]    POSTOPERATIVE DIAGNOSES: Same    PROCEDURE:     1) ORIF of T8-9 Chance fracture   2) T7 to T10 posterolateral instrumented fusion  3) Fusion with allograft, Infuse (BMP) and Mastergraft  4) Stereotactic navigation using O-Arm and Stealth (S8) guidance    SURGEON: Shyam Howell M.D.     ASSISTANT: BHAVIN Jacome. The patient had a complex spine fracture requiring stabilization and fusion, requiring 2 sets of hands, to safely and efficiently complete the operation.    ANESTHESIA: General endotracheal.    ESTIMATED BLOOD LOSS: 100 mL    SPECIMEN: None.    COMPLICATIONS: None.    DRAINS: None    FINDINGS: Instability at T8-9 with anterior disc disruption and ALL rupture and posterior ligamentous injury.    INDICATIONS FOR SURGERY:    The details of the patient's history, presentation, and examination are documented in detail in my consultation note. The risks of surgery that we discussed are documented in detail on the consent form. The patient understands the risks and rationale of surgery and consented to the procedure.         OPERATIVE PROCEDURE:    The patient was brought into the operating room. All lines and monitors were placed by the anesthesia team. The patient was induced with general anesthesia and intubated without difficulty. Electrodes were placed by the [facility] monitoring group. Baseline sensory and motor values were obtained. All values remained stable throughout all portions of the case. Given his cervical fractures, we kept him in an [place] collar and used the [place] head smith for added stability.  The patient was then positioned prone on a Ian frame on the Gigi table (to avoid opening of the ALL rupture). All pressure points were appropriately padded.     The thoracolumbar area was then scrubbed, prepped, and draped in the usual sterile fashion.  AP and lateral fluoroscopy was used to pati out an incision over the T7  to T10 levels.  I injected a total of 20 mL of 0.5% marcaine with epinephrine. A 10 blade was used to incise the skin. Bovie electrocautery was used to obtain hemostasis. I performed a subperiosteal dissection exposing the posterior elements of T7 down to T10. A cerebellar retractor was placed. AP and lateral fluoroscopy was again used to confirm that we were at the appropriate level.     We then performed a spin with the O-Arm and registered the images on the [employer] system (Stealth) after connecting a spinous process clamp and array to the T10 spinous process.  Instruments were registered and we confirmed good navigation results using anatomical landmarks.    I then used the [employer] drill to make starting holes for the pedicles screws (system was Epom from [employer]).  Anatomical landmarks combined with navigated instruments were used to cannulate the pedicles bilaterally using Lenke probes. The holes were palpated with a pedicle feeler to confirm there were no breaches and the holes were then under tapped by 1 mm tap prior to placing a combination of 5.0 and 6.0 X 45-50 mm screws from T7 to T10 bilaterally.  A second spin confirmed good position of all implants.  Rods were then cut and contoured to the appropriate length and secured to the pedicle screws with setscrews.  All screws were final tightened.  The posterior elements were decorticated with a high-speed bur and a posterolateral arthrodesis was performed using local autograft, allograft and Infuse (BMP) and Mastegraft. Vancomycin powder was mixed with the bone graft material and placed in the deep and superficial spaces during closure.  AP and lateral fluoroscopy confirmed appropriate position of all implants.     The wound was irrigated with Ancef irrigation throughout the case and at this time.  The paraspinal musculature and thoracoodorsal fascia were then closed in multiple layers using interrupted 0 Ethibond sutures. We injected a total of 20 mL  of 0.5% Marcaine into the paraspinal muscles and subcutaneous tissues for postoperative analgesia.  Bovie electrocautery was used to raise subcutaneous flaps to allow for a tension-free closure of the skin.  The deep dermal layer was then closed with inverted interrupted 2-0 Vicryl sutures and the skin was closed with a running 3-0 Vicryl subcuticular suture followed by Steri-Strips and Mepilex dressing. All counts of needles, sponges, Cottonoid, and instruments were correct. The patient was positioned supine on the hospital bed, was extubated without difficulty and transferred to the PACU in stable clinical and neurological status.      Shyam Howell MD

## 2021-06-18 NOTE — ANESTHESIA PROCEDURE NOTES
Airway  Urgency: elective    Start Time: 6/18/2021 4:02 PM  Stop Time: 6/18/2021 4:02 PM    Airway not difficult    General Information and Staff    Patient location during procedure: OR  Anesthesiologist: Robbie Li MD  Resident/CRNA: Mike Hansen CRNA    Indications and Patient Condition  Indications for airway management: anesthesia  Sedation level: deep  Preoxygenated: yes  Patient position: sniffing  MILS maintained throughout  Mask difficulty assessment: 1 - vent by mask    Final Airway Details  Final airway type: endotracheal airway      Successful airway: ETT  Cuffed: yes   Successful intubation technique: video laryngoscopy  Facilitating devices/methods: intubating stylet  Endotracheal tube insertion site: oral  Blade: Radha  Blade size: #3  ETT size (mm): 7.5  Cormack-Lehane Classification: grade I - full view of glottis  Placement verified by: chest auscultation and capnometry   Measured from: lips  ETT to lips (cm): 23  Number of attempts at approach: 1  Ventilation between attempts: none  Number of other approaches attempted: 0  Atraumatic airway insertion      Additional Comments  C-collar on throughout
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
## 2021-06-18 NOTE — PROGRESS NOTES
Patient: Ruth Mar  Location: [facility] Unit 3225  MRN: [id number]  Today's date: 6/18/2021    Attempted to see patient for therapy. Unable due to medical hold ( Patient w/ bedrest order in chart & per EMR is for OR today).        OT will require new orders post OP

## 2021-06-18 NOTE — OR SURGEON
Pre-Procedure patient identification:  I am the primary operating surgeon/proceduralist and I have identified the patient and confirmed laterality is MIDLINE T7 to T10 on 06/18/21 at 3:13 PM Shyam Howell MD  Phone Number: [phone]
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
## 2021-06-19 ENCOUNTER — APPOINTMENT (INPATIENT)
Dept: RADIOLOGY | Facility: HOSPITAL | Age: 61
DRG: 982 | End: 2021-06-19
Attending: NEUROLOGICAL SURGERY
Payer: COMMERCIAL

## 2021-06-19 ENCOUNTER — APPOINTMENT (INPATIENT)
Dept: RADIOLOGY | Facility: HOSPITAL | Age: 61
DRG: 982 | End: 2021-06-19
Attending: PHYSICIAN ASSISTANT
Payer: COMMERCIAL

## 2021-06-19 LAB
ANION GAP SERPL CALC-SCNC: 8 MEQ/L (ref 3–15)
BUN SERPL-MCNC: 23 MG/DL (ref 8–20)
CALCIUM SERPL-MCNC: 8.1 MG/DL (ref 8.9–10.3)
CHLORIDE SERPL-SCNC: 103 MEQ/L (ref 98–109)
CO2 SERPL-SCNC: 23 MEQ/L (ref 22–32)
CREAT SERPL-MCNC: 0.9 MG/DL (ref 0.8–1.3)
ERYTHROCYTE [DISTWIDTH] IN BLOOD BY AUTOMATED COUNT: 11.8 % (ref 11.6–14.4)
GFR SERPL CREATININE-BSD FRML MDRD: >60 ML/MIN/1.73M*2
GLUCOSE SERPL-MCNC: 147 MG/DL (ref 70–99)
HCT VFR BLDCO AUTO: 36.8 % (ref 40.1–51)
HGB BLD-MCNC: 13 G/DL (ref 13.7–17.5)
MAGNESIUM SERPL-MCNC: 2.2 MG/DL (ref 1.8–2.5)
MCH RBC QN AUTO: 32.3 PG (ref 28–33.2)
MCHC RBC AUTO-ENTMCNC: 35.3 G/DL (ref 32.2–36.5)
MCV RBC AUTO: 91.3 FL (ref 83–98)
PDW BLD AUTO: 9.6 FL (ref 9.4–12.4)
PLATELET # BLD AUTO: 198 K/UL (ref 150–350)
POTASSIUM SERPL-SCNC: 4.6 MEQ/L (ref 3.6–5.1)
RBC # BLD AUTO: 4.03 M/UL (ref 4.5–5.8)
SODIUM SERPL-SCNC: 134 MEQ/L (ref 136–144)
WBC # BLD AUTO: 11.55 K/UL (ref 3.8–10.5)

## 2021-06-19 PROCEDURE — 63600000 HC DRUGS/DETAIL CODE: Performed by: NEUROLOGICAL SURGERY

## 2021-06-19 PROCEDURE — 71045 X-RAY EXAM CHEST 1 VIEW: CPT

## 2021-06-19 PROCEDURE — 80048 BASIC METABOLIC PNL TOTAL CA: CPT | Performed by: NEUROLOGICAL SURGERY

## 2021-06-19 PROCEDURE — 97162 PT EVAL MOD COMPLEX 30 MIN: CPT | Mod: GP

## 2021-06-19 PROCEDURE — 97530 THERAPEUTIC ACTIVITIES: CPT | Mod: GP

## 2021-06-19 PROCEDURE — 36415 COLL VENOUS BLD VENIPUNCTURE: CPT | Performed by: NEUROLOGICAL SURGERY

## 2021-06-19 PROCEDURE — 72070 X-RAY EXAM THORAC SPINE 2VWS: CPT

## 2021-06-19 PROCEDURE — 83735 ASSAY OF MAGNESIUM: CPT | Performed by: NEUROLOGICAL SURGERY

## 2021-06-19 PROCEDURE — 85027 COMPLETE CBC AUTOMATED: CPT | Performed by: NEUROLOGICAL SURGERY

## 2021-06-19 PROCEDURE — 63700000 HC SELF-ADMINISTRABLE DRUG: Performed by: NEUROLOGICAL SURGERY

## 2021-06-19 PROCEDURE — 200200 PR NO CHARGE: Performed by: NEUROLOGICAL SURGERY

## 2021-06-19 PROCEDURE — 63600000 HC DRUGS/DETAIL CODE: Performed by: PHYSICIAN ASSISTANT

## 2021-06-19 PROCEDURE — 25000000 HC PHARMACY GENERAL: Performed by: NEUROLOGICAL SURGERY

## 2021-06-19 PROCEDURE — 25800000 HC PHARMACY IV SOLUTIONS: Performed by: NEUROLOGICAL SURGERY

## 2021-06-19 PROCEDURE — 20600000 HC ROOM AND CARE INTERMEDIATE/TELEMETRY

## 2021-06-19 PROCEDURE — 97535 SELF CARE MNGMENT TRAINING: CPT | Mod: GO

## 2021-06-19 PROCEDURE — 97166 OT EVAL MOD COMPLEX 45 MIN: CPT | Mod: GO

## 2021-06-19 RX ORDER — ENOXAPARIN SODIUM 100 MG/ML
30 INJECTION SUBCUTANEOUS
Status: DISCONTINUED | OUTPATIENT
Start: 2021-06-19 | End: 2021-06-22 | Stop reason: HOSPADM

## 2021-06-19 RX ORDER — DIAZEPAM 5 MG/1
5 TABLET ORAL EVERY 6 HOURS
Status: DISCONTINUED | OUTPATIENT
Start: 2021-06-19 | End: 2021-06-22 | Stop reason: HOSPADM

## 2021-06-19 RX ADMIN — LISINOPRIL 5 MG: 5 TABLET ORAL at 09:38

## 2021-06-19 RX ADMIN — KETOROLAC TROMETHAMINE 30 MG: 30 INJECTION, SOLUTION INTRAMUSCULAR; INTRAVENOUS at 06:34

## 2021-06-19 RX ADMIN — KETOROLAC TROMETHAMINE 30 MG: 30 INJECTION, SOLUTION INTRAMUSCULAR; INTRAVENOUS at 23:23

## 2021-06-19 RX ADMIN — TAMSULOSIN HYDROCHLORIDE 0.4 MG: 0.4 CAPSULE ORAL at 23:23

## 2021-06-19 RX ADMIN — DOCUSATE SODIUM AND SENNOSIDES 1 TABLET: 50; 8.6 TABLET ORAL at 20:17

## 2021-06-19 RX ADMIN — DIAZEPAM 5 MG: 5 TABLET ORAL at 23:23

## 2021-06-19 RX ADMIN — CEFAZOLIN 2 G: 10 INJECTION, POWDER, FOR SOLUTION INTRAVENOUS at 06:34

## 2021-06-19 RX ADMIN — ACETAMINOPHEN 975 MG: 325 TABLET, FILM COATED ORAL at 00:41

## 2021-06-19 RX ADMIN — OXYCODONE HYDROCHLORIDE 10 MG: 5 TABLET ORAL at 00:41

## 2021-06-19 RX ADMIN — ACETAMINOPHEN 975 MG: 325 TABLET, FILM COATED ORAL at 06:34

## 2021-06-19 RX ADMIN — ENOXAPARIN SODIUM 30 MG: 100 INJECTION SUBCUTANEOUS at 20:17

## 2021-06-19 RX ADMIN — DIAZEPAM 5 MG: 5 TABLET ORAL at 17:24

## 2021-06-19 RX ADMIN — OXYCODONE HYDROCHLORIDE 10 MG: 5 TABLET ORAL at 09:35

## 2021-06-19 RX ADMIN — ENOXAPARIN SODIUM 30 MG: 100 INJECTION SUBCUTANEOUS at 09:37

## 2021-06-19 RX ADMIN — ACETAMINOPHEN 975 MG: 325 TABLET, FILM COATED ORAL at 11:01

## 2021-06-19 RX ADMIN — DOCUSATE SODIUM AND SENNOSIDES 1 TABLET: 50; 8.6 TABLET ORAL at 09:38

## 2021-06-19 RX ADMIN — ACETAMINOPHEN 975 MG: 325 TABLET, FILM COATED ORAL at 17:25

## 2021-06-19 RX ADMIN — KETOROLAC TROMETHAMINE 30 MG: 30 INJECTION, SOLUTION INTRAMUSCULAR; INTRAVENOUS at 13:29

## 2021-06-19 RX ADMIN — DIAZEPAM 5 MG: 5 TABLET ORAL at 11:03

## 2021-06-19 ASSESSMENT — COGNITIVE AND FUNCTIONAL STATUS - GENERAL
HELP NEEDED FOR PERSONAL GROOMING: 4 - NONE
AFFECT: WFL
DRESSING REGULAR LOWER BODY CLOTHING: 1 - TOTAL
STANDING UP FROM CHAIR USING ARMS: 3 - A LITTLE
CLIMB 3 TO 5 STEPS WITH RAILING: 3 - A LITTLE
EATING MEALS: 4 - NONE
AFFECT: WFL
TOILETING: 3 - A LITTLE
HELP NEEDED FOR BATHING: 2 - A LOT
DRESSING REGULAR UPPER BODY CLOTHING: 3 - A LITTLE
WALKING IN HOSPITAL ROOM: 3 - A LITTLE
MOVING TO AND FROM BED TO CHAIR: 3 - A LITTLE
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
## 2021-06-19 NOTE — PLAN OF CARE
Problem: Adult Inpatient Plan of Care  Goal: Plan of Care Review  Outcome: Progressing  Flowsheets (Taken 6/19/2021 1318)  Progress: improving  Plan of Care Reviewed With: patient  Outcome Summary: OT eval completed.  Pt is min A for functional transfers and S for UB dressing and D for LB dressing.  skilled OT services to follow

## 2021-06-19 NOTE — SUBJECTIVE & OBJECTIVE
"Daily Progress Note    Subjective     Interval History: has no complaint of shortness of breath.     Complete Review of Systems - Negative except as stated in interval history      Objective     Vital signs in last 24 hours:  Temp:  [36.3 °C (97.4 °F)-36.9 °C (98.4 °F)] 36.8 °C (98.2 °F)  Heart Rate:  [80-98] 84  Resp:  [12-21] 16  BP: (122-169)/(58-88) 137/74      Intake/Output Summary (Last 24 hours) at 6/19/2021 0934  Last data filed at 6/19/2021 0600  Gross per 24 hour   Intake 1960 ml   Output 1400 ml   Net 560 ml     Intake/Output this shift:  No intake/output data recorded.    Labs    Reviewed  Hgb=13    Imaging  I have indepedently reviewed patient's imaging; any concerning findings adressed below    VTE Assessment  Increased risk from baseline; VTE Prophylaxis as ordered      Physical Exam:  Visit Vitals  /74   Pulse 84   Temp 36.8 °C (98.2 °F) (Oral)   Resp 16   Ht 1.778 m (5' 10\")   Wt 87.2 kg (192 lb 4.8 oz)   SpO2 95%   BMI 27.59 kg/m²       General Appearance:    Alert, cooperative, no distress, appears stated age   Head:    Normocephalic, without obvious abnormality, atraumatic   Eyes:    EOM's intact, fundi     benign, both eyes        Ears:    Normal TM's and external ear canals, both ears   Nose:   Nares normal, septum midline, mucosa normal, no drainage    or sinus   tenderness   Throat:   Lips, mucosa, and tongue normal; teeth and gums normal   Neck:   Supple, symmetrical, trachea midline   Back:     Symmetric, no curvature, ROM normal, no CVA tenderness   Lungs:     Clear to auscultation bilaterally, respirations unlabored   Chest wall:    No tenderness or deformity   Heart:    Regular rate and rhythm, S1 and S2 normal, no murmur, rub   or gallop   Abdomen:     Soft, non-tender, bowel sounds active all four quadrants,     no masses, no organomegaly           Extremities:  Musculoskeletal:   Extremities normal, atraumatic, no cyanosis or edema    No injury or deformity   Pulses:   2+ and " symmetric all extremities   Skin:   Skin color, texture, turgor normal, no rashes or lesions       Neurologic:    Behavior/  Emotional:   Pt with returning strength on right side      Appropriate, cooperative

## 2021-06-19 NOTE — PLAN OF CARE
Problem: Adult Inpatient Plan of Care  Goal: Plan of Care Review  Outcome: Progressing  Flowsheets (Taken 6/19/2021 0669)  Progress: no change  Plan of Care Reviewed With: patient  Outcome Summary: VSS. MYCHAL surgical site--no drainage. Denies numbness or tingling in all extremities--neuro intact. Pain management per orders. Will monitor         Plan of Care Review  Plan of Care Reviewed With: patient  Progress: no change  Outcome Summary: VSS. MYCHAL surgical site. Denies numbness or tingling in all extremities--neuro intact. Pain management per orders. Will monitor
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
## 2021-06-19 NOTE — PLAN OF CARE
Plan of Care Review  Plan of Care Reviewed With: patient  Progress: improving  Outcome Summary: VSS, afebrile, medicated for back pain with + results, transported to radiology for xray, ambulated hallway with walker, returned to room, call bell in reach.

## 2021-06-19 NOTE — PROGRESS NOTES
Patient: Ruth Mar  Location: [facility] Unit 3225  MRN: [id number]  Today's date: 6/19/2021    PT requested WB orders for BUE to be placed into [facility], paged Ortho [phone].

## 2021-06-19 NOTE — NURSING NOTE
Pt received from PACU with NS running at 100ml/hr, aspen collar on, oriented X4 and reports of pain in the back. Significant other at bedside. Denies any numbness or tingling in all extremities. Will monitor
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
## 2021-06-19 NOTE — PLAN OF CARE
Problem: Adult Inpatient Plan of Care  Goal: Plan of Care Review  Outcome: Progressing  Flowsheets (Taken 6/19/2021 1026)  Progress: no change  Plan of Care Reviewed With: patient  Outcome Summary: PT eval complete     Problem: Adult Inpatient Plan of Care  Goal: Patient-Specific Goal (Individualized)  Outcome: Progressing  Flowsheets (Taken 6/19/2021 1026)  Patient-Specific Goals (Include Timeframe): go home at discharge
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
## 2021-06-20 LAB
ANION GAP SERPL CALC-SCNC: 8 MEQ/L (ref 3–15)
BUN SERPL-MCNC: 23 MG/DL (ref 8–20)
CALCIUM SERPL-MCNC: 8.3 MG/DL (ref 8.9–10.3)
CHLORIDE SERPL-SCNC: 104 MEQ/L (ref 98–109)
CO2 SERPL-SCNC: 24 MEQ/L (ref 22–32)
CREAT SERPL-MCNC: 0.8 MG/DL (ref 0.8–1.3)
ERYTHROCYTE [DISTWIDTH] IN BLOOD BY AUTOMATED COUNT: 12.1 % (ref 11.6–14.4)
GFR SERPL CREATININE-BSD FRML MDRD: >60 ML/MIN/1.73M*2
GLUCOSE SERPL-MCNC: 101 MG/DL (ref 70–99)
HCT VFR BLDCO AUTO: 35.2 % (ref 40.1–51)
HGB BLD-MCNC: 12.1 G/DL (ref 13.7–17.5)
MAGNESIUM SERPL-MCNC: 2.3 MG/DL (ref 1.8–2.5)
MCH RBC QN AUTO: 32.1 PG (ref 28–33.2)
MCHC RBC AUTO-ENTMCNC: 34.4 G/DL (ref 32.2–36.5)
MCV RBC AUTO: 93.4 FL (ref 83–98)
PDW BLD AUTO: 9.6 FL (ref 9.4–12.4)
PLATELET # BLD AUTO: 198 K/UL (ref 150–350)
POTASSIUM SERPL-SCNC: 4.4 MEQ/L (ref 3.6–5.1)
RBC # BLD AUTO: 3.77 M/UL (ref 4.5–5.8)
SODIUM SERPL-SCNC: 136 MEQ/L (ref 136–144)
WBC # BLD AUTO: 13.14 K/UL (ref 3.8–10.5)

## 2021-06-20 PROCEDURE — 12000000 HC ROOM AND CARE MED/SURG

## 2021-06-20 PROCEDURE — 36415 COLL VENOUS BLD VENIPUNCTURE: CPT | Performed by: NEUROLOGICAL SURGERY

## 2021-06-20 PROCEDURE — 63700000 HC SELF-ADMINISTRABLE DRUG: Performed by: PHYSICIAN ASSISTANT

## 2021-06-20 PROCEDURE — 80048 BASIC METABOLIC PNL TOTAL CA: CPT | Performed by: NEUROLOGICAL SURGERY

## 2021-06-20 PROCEDURE — 63700000 HC SELF-ADMINISTRABLE DRUG: Performed by: NEUROLOGICAL SURGERY

## 2021-06-20 PROCEDURE — 63600000 HC DRUGS/DETAIL CODE: Performed by: PHYSICIAN ASSISTANT

## 2021-06-20 PROCEDURE — 85027 COMPLETE CBC AUTOMATED: CPT | Performed by: NEUROLOGICAL SURGERY

## 2021-06-20 PROCEDURE — 63600000 HC DRUGS/DETAIL CODE: Performed by: NEUROLOGICAL SURGERY

## 2021-06-20 PROCEDURE — 83735 ASSAY OF MAGNESIUM: CPT | Performed by: NEUROLOGICAL SURGERY

## 2021-06-20 RX ADMIN — ACETAMINOPHEN 975 MG: 325 TABLET, FILM COATED ORAL at 05:33

## 2021-06-20 RX ADMIN — DIAZEPAM 5 MG: 5 TABLET ORAL at 05:33

## 2021-06-20 RX ADMIN — TAMSULOSIN HYDROCHLORIDE 0.4 MG: 0.4 CAPSULE ORAL at 20:44

## 2021-06-20 RX ADMIN — ACETAMINOPHEN 975 MG: 325 TABLET, FILM COATED ORAL at 11:51

## 2021-06-20 RX ADMIN — DIAZEPAM 5 MG: 5 TABLET ORAL at 11:52

## 2021-06-20 RX ADMIN — DOCUSATE SODIUM AND SENNOSIDES 1 TABLET: 50; 8.6 TABLET ORAL at 08:19

## 2021-06-20 RX ADMIN — ENOXAPARIN SODIUM 30 MG: 100 INJECTION SUBCUTANEOUS at 20:44

## 2021-06-20 RX ADMIN — LISINOPRIL 5 MG: 5 TABLET ORAL at 08:19

## 2021-06-20 RX ADMIN — ENOXAPARIN SODIUM 30 MG: 100 INJECTION SUBCUTANEOUS at 08:19

## 2021-06-20 RX ADMIN — KETOROLAC TROMETHAMINE 30 MG: 30 INJECTION, SOLUTION INTRAMUSCULAR; INTRAVENOUS at 22:45

## 2021-06-20 RX ADMIN — DOCUSATE SODIUM AND SENNOSIDES 1 TABLET: 50; 8.6 TABLET ORAL at 20:44

## 2021-06-20 RX ADMIN — OXYCODONE HYDROCHLORIDE 10 MG: 5 TABLET ORAL at 08:24

## 2021-06-20 RX ADMIN — KETOROLAC TROMETHAMINE 30 MG: 30 INJECTION, SOLUTION INTRAMUSCULAR; INTRAVENOUS at 05:34

## 2021-06-20 RX ADMIN — KETOROLAC TROMETHAMINE 30 MG: 30 INJECTION, SOLUTION INTRAMUSCULAR; INTRAVENOUS at 14:26

## 2021-06-20 RX ADMIN — ACETAMINOPHEN 975 MG: 325 TABLET, FILM COATED ORAL at 18:47

## 2021-06-20 RX ADMIN — DIAZEPAM 5 MG: 5 TABLET ORAL at 18:47

## 2021-06-20 NOTE — PLAN OF CARE
Plan of Care Review  Plan of Care Reviewed With: patient  Progress: improving  Outcome Summary: Patient oob in chair this afternoon, tolerated lunch, back in bed. VSS, mild pain, no questions/concerns regarding plan. Call bell within reach.

## 2021-06-20 NOTE — ASSESSMENT & PLAN NOTE
Small post traumatic R hemo/pnx  6/18 - no pneumo / pleural effusion appreciated on x ray   cxr 6/19 clear
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
## 2021-06-20 NOTE — PLAN OF CARE
Plan of Care Review  Plan of Care Reviewed With: patient  Progress: improving  Outcome Summary: Pt assist x1 oob with walker. Q2 hours neuros intact. Pain controlled with IV toradol and PO valium.

## 2021-06-21 ENCOUNTER — BMR PREADMISSION ASSESSMENT (OUTPATIENT)
Dept: ADMISSIONS | Facility: REHABILITATION | Age: 61
End: 2021-06-21

## 2021-06-21 LAB
BASOPHILS # BLD: 0.05 K/UL (ref 0.01–0.1)
BASOPHILS NFR BLD: 0.6 %
DIFFERENTIAL METHOD BLD: ABNORMAL
EOSINOPHIL # BLD: 0.48 K/UL (ref 0.04–0.54)
EOSINOPHIL NFR BLD: 5.4 %
ERYTHROCYTE [DISTWIDTH] IN BLOOD BY AUTOMATED COUNT: 12 % (ref 11.6–14.4)
HCT VFR BLDCO AUTO: 35.6 % (ref 40.1–51)
HGB BLD-MCNC: 12.4 G/DL (ref 13.7–17.5)
IMM GRANULOCYTES # BLD AUTO: 0.04 K/UL (ref 0–0.08)
IMM GRANULOCYTES NFR BLD AUTO: 0.4 %
LYMPHOCYTES # BLD: 1.77 K/UL (ref 1.2–3.5)
LYMPHOCYTES NFR BLD: 19.8 %
MCH RBC QN AUTO: 32.6 PG (ref 28–33.2)
MCHC RBC AUTO-ENTMCNC: 34.8 G/DL (ref 32.2–36.5)
MCV RBC AUTO: 93.7 FL (ref 83–98)
MONOCYTES # BLD: 0.83 K/UL (ref 0.3–1)
MONOCYTES NFR BLD: 9.3 %
NEUTROPHILS # BLD: 5.78 K/UL (ref 1.7–7)
NEUTS SEG NFR BLD: 64.5 %
NRBC BLD-RTO: 0 %
PDW BLD AUTO: 9.1 FL (ref 9.4–12.4)
PLATELET # BLD AUTO: 217 K/UL (ref 150–350)
RBC # BLD AUTO: 3.8 M/UL (ref 4.5–5.8)
WBC # BLD AUTO: 8.95 K/UL (ref 3.8–10.5)

## 2021-06-21 PROCEDURE — 63600000 HC DRUGS/DETAIL CODE: Performed by: PHYSICIAN ASSISTANT

## 2021-06-21 PROCEDURE — 63700000 HC SELF-ADMINISTRABLE DRUG: Performed by: PHYSICIAN ASSISTANT

## 2021-06-21 PROCEDURE — 12000000 HC ROOM AND CARE MED/SURG

## 2021-06-21 PROCEDURE — 200200 PR NO CHARGE: Performed by: NEUROLOGICAL SURGERY

## 2021-06-21 PROCEDURE — 36415 COLL VENOUS BLD VENIPUNCTURE: CPT | Performed by: SURGERY

## 2021-06-21 PROCEDURE — 85025 COMPLETE CBC W/AUTO DIFF WBC: CPT | Performed by: SURGERY

## 2021-06-21 PROCEDURE — 97116 GAIT TRAINING THERAPY: CPT | Mod: GP

## 2021-06-21 RX ORDER — DIPHENHYDRAMINE HCL 50 MG/ML
25 VIAL (ML) INJECTION ONCE
Status: DISPENSED | OUTPATIENT
Start: 2021-06-22 | End: 2021-06-22

## 2021-06-21 RX ORDER — ACETAMINOPHEN 500 MG
5 TABLET ORAL NIGHTLY
Status: DISCONTINUED | OUTPATIENT
Start: 2021-06-22 | End: 2021-06-22 | Stop reason: HOSPADM

## 2021-06-21 RX ADMIN — ACETAMINOPHEN 975 MG: 325 TABLET, FILM COATED ORAL at 00:23

## 2021-06-21 RX ADMIN — KETOROLAC TROMETHAMINE 30 MG: 30 INJECTION, SOLUTION INTRAMUSCULAR; INTRAVENOUS at 05:56

## 2021-06-21 RX ADMIN — DOCUSATE SODIUM AND SENNOSIDES 1 TABLET: 50; 8.6 TABLET ORAL at 09:42

## 2021-06-21 RX ADMIN — KETOROLAC TROMETHAMINE 30 MG: 30 INJECTION, SOLUTION INTRAMUSCULAR; INTRAVENOUS at 21:29

## 2021-06-21 RX ADMIN — DIAZEPAM 5 MG: 5 TABLET ORAL at 00:23

## 2021-06-21 RX ADMIN — KETOROLAC TROMETHAMINE 30 MG: 30 INJECTION, SOLUTION INTRAMUSCULAR; INTRAVENOUS at 13:25

## 2021-06-21 RX ADMIN — ACETAMINOPHEN 975 MG: 325 TABLET, FILM COATED ORAL at 23:39

## 2021-06-21 RX ADMIN — TAMSULOSIN HYDROCHLORIDE 0.4 MG: 0.4 CAPSULE ORAL at 21:30

## 2021-06-21 RX ADMIN — DIAZEPAM 5 MG: 5 TABLET ORAL at 11:58

## 2021-06-21 RX ADMIN — LISINOPRIL 5 MG: 5 TABLET ORAL at 09:41

## 2021-06-21 RX ADMIN — DIAZEPAM 5 MG: 5 TABLET ORAL at 18:55

## 2021-06-21 RX ADMIN — ACETAMINOPHEN 975 MG: 325 TABLET, FILM COATED ORAL at 11:57

## 2021-06-21 RX ADMIN — DOCUSATE SODIUM AND SENNOSIDES 1 TABLET: 50; 8.6 TABLET ORAL at 21:28

## 2021-06-21 RX ADMIN — ACETAMINOPHEN 975 MG: 325 TABLET, FILM COATED ORAL at 18:55

## 2021-06-21 RX ADMIN — ENOXAPARIN SODIUM 30 MG: 100 INJECTION SUBCUTANEOUS at 09:42

## 2021-06-21 RX ADMIN — DIAZEPAM 5 MG: 5 TABLET ORAL at 05:56

## 2021-06-21 RX ADMIN — ENOXAPARIN SODIUM 30 MG: 100 INJECTION SUBCUTANEOUS at 21:29

## 2021-06-21 RX ADMIN — DIAZEPAM 5 MG: 5 TABLET ORAL at 23:38

## 2021-06-21 RX ADMIN — ACETAMINOPHEN 975 MG: 325 TABLET, FILM COATED ORAL at 05:55

## 2021-06-21 RX ADMIN — Medication 5 MG: at 23:46

## 2021-06-21 ASSESSMENT — COGNITIVE AND FUNCTIONAL STATUS - GENERAL
MOVING TO AND FROM BED TO CHAIR: 3 - A LITTLE
CLIMB 3 TO 5 STEPS WITH RAILING: 3 - A LITTLE
WALKING IN HOSPITAL ROOM: 3 - A LITTLE
STANDING UP FROM CHAIR USING ARMS: 3 - A LITTLE
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
## 2021-06-21 NOTE — PLAN OF CARE
Plan of Care Review  Plan of Care Reviewed With: patient  Progress: improving  Outcome Summary: Pt in bed, dozing off and on. Seem to have a good pain management plan, as pain has been minimal. Pt joked that he was unable to meet his IS goal of 2500.  VSS.  Pt pleasant with no needs at this time. Will continue to monitor
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
## 2021-06-22 VITALS
BODY MASS INDEX: 27.53 KG/M2 | OXYGEN SATURATION: 97 % | RESPIRATION RATE: 18 BRPM | WEIGHT: 192.3 LBS | TEMPERATURE: 97.3 F | HEIGHT: 70 IN | DIASTOLIC BLOOD PRESSURE: 73 MMHG | SYSTOLIC BLOOD PRESSURE: 130 MMHG | HEART RATE: 79 BPM

## 2021-06-22 PROCEDURE — 63700000 HC SELF-ADMINISTRABLE DRUG: Performed by: PHYSICIAN ASSISTANT

## 2021-06-22 PROCEDURE — 63600000 HC DRUGS/DETAIL CODE: Performed by: PHYSICIAN ASSISTANT

## 2021-06-22 PROCEDURE — 200200 PR NO CHARGE: Performed by: NEUROLOGICAL SURGERY

## 2021-06-22 RX ORDER — TAMSULOSIN HYDROCHLORIDE 0.4 MG/1
0.4 CAPSULE ORAL NIGHTLY
Qty: 15 CAPSULE | Refills: 0 | Status: SHIPPED | OUTPATIENT
Start: 2021-06-22 | End: 2021-07-07

## 2021-06-22 RX ORDER — OXYCODONE HYDROCHLORIDE 10 MG/1
10 TABLET ORAL EVERY 4 HOURS PRN
Qty: 30 TABLET | Refills: 0 | Status: SHIPPED | OUTPATIENT
Start: 2021-06-22 | End: 2021-06-28 | Stop reason: SDUPTHER

## 2021-06-22 RX ORDER — DIAZEPAM 5 MG/1
5 TABLET ORAL EVERY 6 HOURS PRN
Qty: 20 TABLET | Refills: 0 | Status: SHIPPED | OUTPATIENT
Start: 2021-06-22 | End: 2021-06-28 | Stop reason: SDUPTHER

## 2021-06-22 RX ORDER — ACETAMINOPHEN 325 MG/1
650 TABLET ORAL EVERY 4 HOURS PRN
COMMUNITY
Start: 2021-06-22 | End: 2021-07-22

## 2021-06-22 RX ORDER — AMOXICILLIN 250 MG
1 CAPSULE ORAL 2 TIMES DAILY
Qty: 60 TABLET | Refills: 0 | COMMUNITY
Start: 2021-06-22 | End: 2021-09-07

## 2021-06-22 RX ADMIN — KETOROLAC TROMETHAMINE 30 MG: 30 INJECTION, SOLUTION INTRAMUSCULAR; INTRAVENOUS at 06:38

## 2021-06-22 RX ADMIN — DIAZEPAM 5 MG: 5 TABLET ORAL at 06:37

## 2021-06-22 RX ADMIN — ACETAMINOPHEN 975 MG: 325 TABLET, FILM COATED ORAL at 12:07

## 2021-06-22 RX ADMIN — KETOROLAC TROMETHAMINE 30 MG: 30 INJECTION, SOLUTION INTRAMUSCULAR; INTRAVENOUS at 13:35

## 2021-06-22 RX ADMIN — ACETAMINOPHEN 975 MG: 325 TABLET, FILM COATED ORAL at 06:37

## 2021-06-22 RX ADMIN — ENOXAPARIN SODIUM 30 MG: 100 INJECTION SUBCUTANEOUS at 08:14

## 2021-06-22 RX ADMIN — DOCUSATE SODIUM AND SENNOSIDES 1 TABLET: 50; 8.6 TABLET ORAL at 08:15

## 2021-06-22 RX ADMIN — OXYCODONE HYDROCHLORIDE 10 MG: 5 TABLET ORAL at 03:44

## 2021-06-22 RX ADMIN — DIAZEPAM 5 MG: 5 TABLET ORAL at 12:07

## 2021-06-22 RX ADMIN — OXYCODONE HYDROCHLORIDE 10 MG: 5 TABLET ORAL at 09:28

## 2021-06-22 RX ADMIN — LISINOPRIL 5 MG: 5 TABLET ORAL at 08:15
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
## 2021-06-22 NOTE — PROGRESS NOTES
MSW CC met with pt who is for dc home today. Pt states he has [employer]. Pt was interested in having homecare arranged for when he discharges home. [facility] CC followed up with [facility]. [facility] [place]. [facility]. [facility] followed up with [facility] and they can accept pt. Pt is for dc to home today. Wife is here at this time to take him home.    PLAN: dc home with [facility].

## 2021-06-22 NOTE — NURSING NOTE
Pt discharged. AVS reviewed and all questions answered. All belongings sent with patient. IV removed. Pt left unit via wheelchair. Wife to drive patient home.

## 2021-06-22 NOTE — DISCHARGE INSTRUCTIONS
You are set up with "[facility]. They will contact you within 24-48 hours after you get home to arrange the first visit into the home. They can be reached at [phone].    Cervical Fracture    A cervical fracture is a break in 1 or more of the 7 cervical vertebrae (bones) in your neck. Cervical vertebrae support your head and allow your neck to bend and twist. The vertebrae enclose and protect the spinal cord, which controls your ability to move.      Medicines:  • Pain medicine: You may be given medicine to take away or decrease pain. Do not wait until the pain is severe before you take your medicine. Please be sure to take a stool softner, such as Colace, to help prevent constipation while taking prescription narcotics.  • As the pain begins to lessen you may take Tylenol instead of a prescription pain medication.  Skin and brace care:  • Please wear your collar until your follow-up with the Neurosurgeon.   • Skin breakdown can lead to deep wounds caused by pressure or pulling on your skin. Carefully check your chin, ears, back of your head, and shoulders for redness or sores if you are wearing a collar.    Contact Neurosurgery if:  • You have a fever.  • You see a skin rash, redness, or sores under your brace.  • You have problems swallowing while you are wearing your cervical collar.  • Your neck pain is not getting better even with treatment.  • You have questions or concerns about your cervical fracture, medicine, or care.    Seek care immediately or call 911 if:  • You have a sudden, severe headache with nausea and vomiting.  • You are seeing double or cannot see out of 1 eye.  • You cannot stay awake.  • You feel new weakness or numbness in your hands or fingers.  • You are short of breath.  • You cannot feel or move your arms or legs.  • You cough up blood.  • You feel lightheaded, short of breath, and have chest pain. You cough up blood.  • You feel lightheaded, short of breath, and have chest  pain.  • Your arm or leg feels warm, tender, and painful. It may look swollen and red.          Thoracic Spine Fracture  A thoracic spine fracture is a break in one of the bones of the middle part of the back. Thoracic spine fractures can vary from mild to severe. The most severe types are those that:  · Cause the broken bones to move out of place (unstable).  · Injure or press on the spinal cord.  How is this treated?  If your fracture is unstable or if it affects your spinal cord, you may need surgery.  Follow these instructions at home:  Medicines  · Take over-the-counter and prescription medicines only as told by your health care provider.  · Do not drive or use heavy machinery while taking pain medicine.  · To prevent or treat constipation while you are taking prescription pain medicine, your health care provider may recommend that you:  ? Drink enough fluid to keep your urine pale yellow.  ? Take over-the-counter or prescription medicines.  ? Eat foods that are high in fiber, such as fresh fruits and vegetables, whole grains, and beans.  ? Limit foods that are high in fat and processed sugars, such as fried or sweet foods.  If you have a brace:  · Wear the brace as told by your health care provider. Remove it only as told by your health care provider.  · Keep the brace clean.  · If the brace is not waterproof:  ? Do not let it get wet.  ? Cover it with a watertight covering when you take a bath or a shower.      General Instructions  Do not use any products that contain nicotine or tobacco, such as cigarettes and e-cigarettes. These can delay healing after injury. If you need help quitting, ask your health care provider.  · Do not drink alcohol. Alcohol can interfere with your treatment.  · Keep all follow-up visits as directed by your health care provider. This is important. It can help to prevent permanent injury, disability, and long-lasting (chronic) pain.  Contact a health care provider if:  · You have a  fever.  · You develop a cough that makes your pain worse.  · Your pain medicine is not helping.  · Your pain does not get better over time.  · You cannot return to your normal activities as planned or expected.  Get help right away if:  · Your pain is very bad and it suddenly gets worse.  · You are unable to move any part of your body (paralysis) that is below the level of your injury.  · You have numbness, tingling, or weakness in any part of your body that is below the level of your injury.  · You cannot control your bladder or bowels.  Summary  · A thoracic spine fracture is a break in one of the bones of the middle part of the back.  · Treatment for this injury depends on the type of fracture.  · A stable fracture can be treated with a back brace, activity restrictions, pain medicine, and physical therapy. A more severe break may require surgery.  · Make sure you know what symptoms should cause you to get help right away.    DRESSINGS AND WOUND CARE:    Wash wounds using light pressure.    It is important to have a friend or family member check your incision/wound for the first week. Call our office immediately if you develop signs and symptoms of infection, including drainage, redness, swelling and/or fever greater than 101 degrees.     No tub baths, swimming, Jacuzzi tubs, hot tubs or any other activity that would require submersion of your incision/wound in water for 2 weeks.    You may shower. Let the water run over the incision and dab dry.     Hand Fracture  Wear splint as directed by orthopedic surgery.  No lifting with left hand.  Follow up with Hand Surgery Dr. Thomas.    FOLLOW-UP:    Call Dr Shyam Howell MD (Neurosurgery) at [phone] to make an appointment in 2 weeks.    Call Dalton Thomas MD (Orthopedics) at [phone] for follow up appointment in 1 week.    Call your family doctor in 1-2 weeks for general follow up of your injuries and admission to the hospital.    Incidental Findings:        Discuss the following incidental findings with your Primary Care Physician    Moderate hepatic steatosis.  Multiple hepatic cysts, the largest in the   left lobe measuring up to 1.6 cm.          Call your doctor or return to the Emergency Room of you develop any of the following:     Persistent nausea and vomiting   Increasing headache pain   Chest Pain   Shortness of Breath   Drainage from incision or wound   Increased redness around incision or wound   Foul odor from incision or wound   Fever above 101 degrees or chills   Difficulty or inability to urinate   Intense pain not relieved with pain medication   Change in mental status    Difficulty or decreased ability performing activities of daily living

## 2021-06-22 NOTE — PLAN OF CARE
Problem: Adult Inpatient Plan of Care  Goal: Plan of Care Review  Outcome: Progressing  Flowsheets (Taken 6/22/2021 9346)  Progress: improving  Plan of Care Reviewed With: patient  Outcome Summary: back pain controlled with valium/toradol/tylenol.neurovascular/neurochecks within baseline.pt walking with walker in hallway assist x 1 without difficulty. uneventful overnight.   Plan of Care Review  Plan of Care Reviewed With: patient  Progress: improving  Outcome Summary: back pain controlled with valium/toradol/tylenol.neurovascular/neurochecks within baseline.pt walking with walker in hallway assist x 1 without difficulty. uneventful overnight.
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
## 2021-06-28 ENCOUNTER — TELEPHONE (OUTPATIENT)
Dept: NEUROSURGERY | Facility: CLINIC | Age: 61
End: 2021-06-28

## 2021-06-28 NOTE — TELEPHONE ENCOUNTER
Pt's fiance LVM requesting refills, tried calling [facility] back and LVM to clarify what refills the patient is looking for. Sx on 6/18, Pt has appt with our office on 7/2/21.

## 2021-06-28 NOTE — TELEPHONE ENCOUNTER
[facility] requesting diazepam 5mg and oxycodone 10mg. Pt will be out of meds before his appt on Fri 6/2.      Requested Prescriptions     Pending Prescriptions Disp Refills   • diazePAM (VALIUM) 5 mg tablet 56 tablet 0     Sig: Take 1 tablet (5 mg total) by mouth every 6 (six) hours as needed for muscle spasms for up to 14 days. Ongoing Rx   • oxyCODONE (ROXICODONE) 10 mg immediate release tablet 42 tablet 0     Sig: Take 1 tablet (10 mg total) by mouth every 4 (four) hours as needed for moderate pain or severe pain for up to 7 days. Ongoing Rx         [id number] [place] - [address]  [address] [place] [id number]  Phone: [phone] Fax: [phone]

## 2021-06-29 RX ORDER — DIAZEPAM 5 MG/1
5 TABLET ORAL EVERY 8 HOURS PRN
Qty: 30 TABLET | Refills: 0 | Status: SHIPPED | OUTPATIENT
Start: 2021-06-29 | End: 2021-07-16 | Stop reason: SDUPTHER

## 2021-06-29 RX ORDER — OXYCODONE HYDROCHLORIDE 10 MG/1
10 TABLET ORAL EVERY 4 HOURS PRN
Qty: 42 TABLET | Refills: 0 | Status: SHIPPED | OUTPATIENT
Start: 2021-06-29 | End: 2021-07-16 | Stop reason: SDUPTHER

## 2021-07-02 ENCOUNTER — OFFICE VISIT (OUTPATIENT)
Dept: NEUROSURGERY | Facility: CLINIC | Age: 61
End: 2021-07-02
Payer: COMMERCIAL

## 2021-07-02 VITALS
HEART RATE: 84 BPM | WEIGHT: 188.4 LBS | OXYGEN SATURATION: 97 % | BODY MASS INDEX: 26.97 KG/M2 | SYSTOLIC BLOOD PRESSURE: 133 MMHG | DIASTOLIC BLOOD PRESSURE: 75 MMHG | TEMPERATURE: 97.5 F | HEIGHT: 70 IN | RESPIRATION RATE: 18 BRPM

## 2021-07-02 DIAGNOSIS — S12.691D OTHER CLOSED NONDISPLACED FRACTURE OF SEVENTH CERVICAL VERTEBRA WITH ROUTINE HEALING, SUBSEQUENT ENCOUNTER: ICD-10-CM

## 2021-07-02 DIAGNOSIS — Z98.1 S/P FUSION OF THORACIC SPINE: Primary | ICD-10-CM

## 2021-07-02 PROCEDURE — 99024 POSTOP FOLLOW-UP VISIT: CPT | Performed by: PHYSICIAN ASSISTANT
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
## 2021-07-16 ENCOUNTER — HOSPITAL ENCOUNTER (OUTPATIENT)
Dept: RADIOLOGY | Facility: HOSPITAL | Age: 61
Discharge: HOME | End: 2021-07-16
Attending: PHYSICIAN ASSISTANT
Payer: COMMERCIAL

## 2021-07-16 ENCOUNTER — OFFICE VISIT (OUTPATIENT)
Dept: NEUROSURGERY | Facility: CLINIC | Age: 61
End: 2021-07-16
Payer: COMMERCIAL

## 2021-07-16 VITALS
TEMPERATURE: 97.4 F | SYSTOLIC BLOOD PRESSURE: 133 MMHG | HEART RATE: 84 BPM | HEIGHT: 70 IN | OXYGEN SATURATION: 98 % | WEIGHT: 185 LBS | DIASTOLIC BLOOD PRESSURE: 79 MMHG | BODY MASS INDEX: 26.48 KG/M2

## 2021-07-16 DIAGNOSIS — S12.691D OTHER CLOSED NONDISPLACED FRACTURE OF SEVENTH CERVICAL VERTEBRA WITH ROUTINE HEALING, SUBSEQUENT ENCOUNTER: ICD-10-CM

## 2021-07-16 DIAGNOSIS — Z98.1 S/P FUSION OF THORACIC SPINE: ICD-10-CM

## 2021-07-16 DIAGNOSIS — M25.511 ACUTE PAIN OF RIGHT SHOULDER: Primary | ICD-10-CM

## 2021-07-16 DIAGNOSIS — R29.898 RIGHT ARM WEAKNESS: ICD-10-CM

## 2021-07-16 DIAGNOSIS — M25.511 ACUTE PAIN OF RIGHT SHOULDER: ICD-10-CM

## 2021-07-16 DIAGNOSIS — S22.009D CLOSED FRACTURE OF THORACIC SPINE WITHOUT SPINAL CORD LESION WITH ROUTINE HEALING, SUBSEQUENT ENCOUNTER: ICD-10-CM

## 2021-07-16 PROCEDURE — 99024 POSTOP FOLLOW-UP VISIT: CPT | Performed by: PHYSICIAN ASSISTANT

## 2021-07-16 PROCEDURE — 72070 X-RAY EXAM THORAC SPINE 2VWS: CPT

## 2021-07-16 PROCEDURE — 73030 X-RAY EXAM OF SHOULDER: CPT | Mod: RT

## 2021-07-16 PROCEDURE — 72040 X-RAY EXAM NECK SPINE 2-3 VW: CPT

## 2021-07-16 RX ORDER — OXYCODONE HYDROCHLORIDE 10 MG/1
10 TABLET ORAL EVERY 4 HOURS PRN
Qty: 40 TABLET | Refills: 0 | Status: SHIPPED | OUTPATIENT
Start: 2021-07-16 | End: 2021-08-05 | Stop reason: SDUPTHER

## 2021-07-16 RX ORDER — DIAZEPAM 5 MG/1
5 TABLET ORAL EVERY 8 HOURS PRN
Qty: 40 TABLET | Refills: 0 | Status: SHIPPED | OUTPATIENT
Start: 2021-07-16 | End: 2021-08-27 | Stop reason: SDUPTHER
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
## 2021-07-19 ENCOUNTER — TELEPHONE (OUTPATIENT)
Dept: NEUROSURGERY | Facility: CLINIC | Age: 61
End: 2021-07-19

## 2021-07-19 NOTE — TELEPHONE ENCOUNTER
Patient would like to know the results of the recent xray and if he has any restriction for starting physical therapy. Patient would like to wean off the oxycodone and start taking ibuprofen. Please advise the patient of weaning off.

## 2021-07-20 ENCOUNTER — TELEPHONE (OUTPATIENT)
Dept: NEUROSURGERY | Facility: CLINIC | Age: 61
End: 2021-07-20

## 2021-07-20 NOTE — TELEPHONE ENCOUNTER
----- Message from Demetra Riggins sent at 7/20/2021 10:10 AM EDT -----  Regarding: Patient called again  Patient called in again at [facility] stating that they would like the results for their xray and they do have an appt today with physical therapy and would like to know if they need to cancel it or if they should go. I did tell patient yesterday when they called that you were in surgery all day and that you would give them a call today. Phone # [phone]

## 2021-07-20 NOTE — TELEPHONE ENCOUNTER
No fracture of right shoulder.  Cleared for PT with [place] collar immobilization.      Plan for CT/MRI of cervical spine

## 2021-08-05 ENCOUNTER — HOSPITAL ENCOUNTER (OUTPATIENT)
Dept: RADIOLOGY | Facility: HOSPITAL | Age: 61
Discharge: HOME | End: 2021-08-05
Attending: PHYSICIAN ASSISTANT
Payer: COMMERCIAL

## 2021-08-05 DIAGNOSIS — R29.898 RIGHT ARM WEAKNESS: ICD-10-CM

## 2021-08-05 DIAGNOSIS — S22.009D CLOSED FRACTURE OF THORACIC SPINE WITHOUT SPINAL CORD LESION WITH ROUTINE HEALING, SUBSEQUENT ENCOUNTER: ICD-10-CM

## 2021-08-05 DIAGNOSIS — M25.511 ACUTE PAIN OF RIGHT SHOULDER: ICD-10-CM

## 2021-08-05 DIAGNOSIS — S12.691D OTHER CLOSED NONDISPLACED FRACTURE OF SEVENTH CERVICAL VERTEBRA WITH ROUTINE HEALING, SUBSEQUENT ENCOUNTER: ICD-10-CM

## 2021-08-05 PROCEDURE — 72125 CT NECK SPINE W/O DYE: CPT | Mod: MG

## 2021-08-05 RX ORDER — OXYCODONE HYDROCHLORIDE 10 MG/1
10 TABLET ORAL EVERY 6 HOURS PRN
Qty: 60 TABLET | Refills: 0 | Status: SHIPPED | OUTPATIENT
Start: 2021-08-05 | End: 2021-09-04

## 2021-08-05 NOTE — TELEPHONE ENCOUNTER
Requested Prescriptions     Pending Prescriptions Disp Refills   • oxyCODONE (ROXICODONE) 10 mg immediate release tablet 60 tablet 0     Sig: Take 1 tablet (10 mg total) by mouth every 6 (six) hours as needed for severe pain. Ongoing Rx         [id number] [place] - [address]  [address] [place] [address]  Phone: [phone] Fax: [phone]        [place] Prescription Drug Monitoring Program checked online. Last refilled on 7/16/21 for 7 days. Sx on 06/18/21

## 2021-08-11 ENCOUNTER — TELEPHONE (OUTPATIENT)
Dept: NEUROSURGERY | Facility: CLINIC | Age: 61
End: 2021-08-11

## 2021-08-11 NOTE — TELEPHONE ENCOUNTER
Patient called stating they had imaging done last week and would like the results reviewed. Please contact the patient w/results.

## 2021-08-27 RX ORDER — DIAZEPAM 5 MG/1
5 TABLET ORAL EVERY 8 HOURS PRN
Qty: 40 TABLET | Refills: 0 | Status: SHIPPED | OUTPATIENT
Start: 2021-08-27 | End: 2021-10-06 | Stop reason: SDUPTHER

## 2021-08-27 NOTE — TELEPHONE ENCOUNTER
Requested Prescriptions     Pending Prescriptions Disp Refills   • diazePAM (VALIUM) 5 mg tablet 40 tablet 0     Sig: Take 1 tablet (5 mg total) by mouth every 8 (eight) hours as needed for muscle spasms. Ongoing Rx         [id number] [place] - [address]  [address] [place] [address]  Phone: [phone] Fax: [phone]    [place] Prescription Drug Monitoring Program Checked Online

## 2021-09-07 ENCOUNTER — OFFICE VISIT (OUTPATIENT)
Dept: NEUROSURGERY | Facility: CLINIC | Age: 61
End: 2021-09-07
Payer: COMMERCIAL

## 2021-09-07 ENCOUNTER — HOSPITAL ENCOUNTER (OUTPATIENT)
Dept: RADIOLOGY | Facility: HOSPITAL | Age: 61
Discharge: HOME | End: 2021-09-07
Attending: PHYSICIAN ASSISTANT
Payer: COMMERCIAL

## 2021-09-07 VITALS
HEART RATE: 85 BPM | WEIGHT: 191 LBS | BODY MASS INDEX: 27.35 KG/M2 | HEIGHT: 70 IN | SYSTOLIC BLOOD PRESSURE: 130 MMHG | OXYGEN SATURATION: 98 % | DIASTOLIC BLOOD PRESSURE: 80 MMHG

## 2021-09-07 DIAGNOSIS — S22.009D CLOSED FRACTURE OF THORACIC SPINE WITHOUT SPINAL CORD LESION WITH ROUTINE HEALING, SUBSEQUENT ENCOUNTER: ICD-10-CM

## 2021-09-07 DIAGNOSIS — M75.01 ADHESIVE CAPSULITIS OF RIGHT SHOULDER: ICD-10-CM

## 2021-09-07 DIAGNOSIS — S12.691D OTHER CLOSED NONDISPLACED FRACTURE OF SEVENTH CERVICAL VERTEBRA WITH ROUTINE HEALING, SUBSEQUENT ENCOUNTER: Primary | ICD-10-CM

## 2021-09-07 PROCEDURE — 99024 POSTOP FOLLOW-UP VISIT: CPT | Performed by: NEUROLOGICAL SURGERY

## 2021-09-07 PROCEDURE — 72070 X-RAY EXAM THORAC SPINE 2VWS: CPT

## 2021-09-07 RX ORDER — OXYCODONE HYDROCHLORIDE 10 MG/1
10 TABLET ORAL EVERY 4 HOURS PRN
COMMUNITY
End: 2021-10-06 | Stop reason: SDUPTHER
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
## 2021-09-22 ENCOUNTER — TELEPHONE (OUTPATIENT)
Dept: NEUROSURGERY | Facility: CLINIC | Age: 61
End: 2021-09-22

## 2021-09-22 NOTE — TELEPHONE ENCOUNTER
Ct approved good for main line health. Precert number [id number], Good for main line health. Spoke to main line scheduling with precert number.

## 2021-09-23 ENCOUNTER — HOSPITAL ENCOUNTER (OUTPATIENT)
Dept: RADIOLOGY | Age: 61
Discharge: HOME | End: 2021-09-23
Attending: NEUROLOGICAL SURGERY
Payer: COMMERCIAL

## 2021-09-23 DIAGNOSIS — S12.691D OTHER CLOSED NONDISPLACED FRACTURE OF SEVENTH CERVICAL VERTEBRA WITH ROUTINE HEALING, SUBSEQUENT ENCOUNTER: ICD-10-CM

## 2021-09-23 PROCEDURE — G1004 CDSM NDSC: HCPCS

## 2021-09-24 ENCOUNTER — TELEPHONE (OUTPATIENT)
Dept: OPERATING ROOM | Facility: HOSPITAL | Age: 61
End: 2021-09-24

## 2021-09-24 NOTE — TELEPHONE ENCOUNTER
Shyam Howell MD Hosseini, Courtney  Cc: Willow Harrison PA C  Fracture healed.  No need for further wearing of hard collar.  Can wean out of it.             Previous Messages          CT CERVICAL SPINE WITHOUT IV CONTRAST  Order: [id number]   Status: Final result     Visible to patient: Yes (seen)     Dx: Other closed nondisplaced fracture of...     0 Result Notes    Details    Reading Physician Reading Date Result Priority   Mateo Hood MD  [phone] 9/23/2021      Narrative & Impression  CLINICAL HISTORY: Follow-up fracture     PRIOR STUDY: CT cervical spine dated 6/17/2021     TECHNIQUE:  Noncontrast CT cervical spine

## 2021-10-06 NOTE — TELEPHONE ENCOUNTER
Pt only takes medication prn at night to sleep.     Requested Prescriptions     Pending Prescriptions Disp Refills   • diazePAM (VALIUM) 5 mg tablet 40 tablet 0     Sig: Take 1 tablet (5 mg total) by mouth every 8 (eight) hours as needed for muscle spasms for up to 14 days. Ongoing Rx   • oxyCODONE (ROXICODONE) 10 mg immediate release tablet 40 tablet 0     Sig: Take 1 tablet (10 mg total) by mouth every 8 (eight) hours as needed for moderate pain for up to 14 days.         [id number] [place] - [address]  [address] [place] [address]  Phone: [phone] Fax: [phone]          Unable to check PDMP, system down.

## 2021-10-07 ENCOUNTER — TELEPHONE (OUTPATIENT)
Dept: NEUROSURGERY | Facility: CLINIC | Age: 61
End: 2021-10-07

## 2021-10-07 RX ORDER — OXYCODONE HYDROCHLORIDE 10 MG/1
10 TABLET ORAL EVERY 8 HOURS PRN
Qty: 40 TABLET | Refills: 0 | Status: SHIPPED | OUTPATIENT
Start: 2021-10-07 | End: 2021-10-21

## 2021-10-07 RX ORDER — DIAZEPAM 5 MG/1
5 TABLET ORAL EVERY 8 HOURS PRN
Qty: 40 TABLET | Refills: 0 | Status: SHIPPED | OUTPATIENT
Start: 2021-10-07 | End: 2021-10-21

## 2021-10-07 NOTE — TELEPHONE ENCOUNTER
Pt is asking for recommendations/advice for PT and what to expect now that he is 4 months post op.    [facility] had about 30 visits for his back already and his insurance only covers 20 a year. He saw a hand specialist who is now recommending hand PT.    The current physical therapist is recommending PT for additional 8 weeks 3 days a week.    He also found out that he has a torn rotator cuff and may need surgery.     Please call patient at [phone].

## 2021-10-08 DIAGNOSIS — S46.011A TRAUMATIC INCOMPLETE TEAR OF RIGHT ROTATOR CUFF, INITIAL ENCOUNTER: ICD-10-CM

## 2021-10-08 DIAGNOSIS — Z98.1 S/P FUSION OF THORACIC SPINE: ICD-10-CM

## 2021-10-08 DIAGNOSIS — M25.511 ACUTE PAIN OF RIGHT SHOULDER: ICD-10-CM
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
## 2021-10-08 DIAGNOSIS — S12.691D OTHER CLOSED NONDISPLACED FRACTURE OF SEVENTH CERVICAL VERTEBRA WITH ROUTINE HEALING, SUBSEQUENT ENCOUNTER: Primary | ICD-10-CM

## 2021-10-08 DIAGNOSIS — R29.898 RIGHT HAND WEAKNESS: ICD-10-CM

## 2021-10-08 DIAGNOSIS — M75.01 ADHESIVE CAPSULITIS OF RIGHT SHOULDER: ICD-10-CM

## 2021-10-08 DIAGNOSIS — S22.009D CLOSED FRACTURE OF THORACIC SPINE WITHOUT SPINAL CORD LESION WITH ROUTINE HEALING, SUBSEQUENT ENCOUNTER: ICD-10-CM

## 2021-10-22 NOTE — TELEPHONE ENCOUNTER
I had Karl help me with this. She called the insurance company and was on hold for over an hour. She's going to try and call again

## 2021-11-15 NOTE — TELEPHONE ENCOUNTER
Was unable to get thru to anyone who could help at the insurance company. Spoke to Melissa at the [facility] who states that the patient is now enrolled in their fitness program which is lower cost and is self pay and isn't submitted thru insurance. Upon checking, Melissa says that the patient has visits left if he would like to switch back to PT. His insurance reset 10/1/21 so he has a new 20 visits as of then.    PTW PT: [phone]

## 2021-12-16 ENCOUNTER — OFFICE VISIT (OUTPATIENT)
Dept: NEUROSURGERY | Facility: CLINIC | Age: 61
End: 2021-12-16
Payer: COMMERCIAL

## 2021-12-16 ENCOUNTER — HOSPITAL ENCOUNTER (OUTPATIENT)
Dept: RADIOLOGY | Facility: HOSPITAL | Age: 61
Discharge: HOME | End: 2021-12-16
Attending: NEUROLOGICAL SURGERY
Payer: COMMERCIAL

## 2021-12-16 VITALS
HEART RATE: 71 BPM | DIASTOLIC BLOOD PRESSURE: 80 MMHG | BODY MASS INDEX: 27.92 KG/M2 | TEMPERATURE: 96.8 F | HEIGHT: 70 IN | OXYGEN SATURATION: 98 % | SYSTOLIC BLOOD PRESSURE: 132 MMHG | RESPIRATION RATE: 16 BRPM | WEIGHT: 195 LBS

## 2021-12-16 DIAGNOSIS — S22.009D CLOSED FRACTURE OF THORACIC SPINE WITHOUT SPINAL CORD LESION WITH ROUTINE HEALING, SUBSEQUENT ENCOUNTER: Primary | ICD-10-CM

## 2021-12-16 DIAGNOSIS — S22.009D CLOSED FRACTURE OF THORACIC SPINE WITHOUT SPINAL CORD LESION WITH ROUTINE HEALING, SUBSEQUENT ENCOUNTER: ICD-10-CM

## 2021-12-16 PROCEDURE — 72070 X-RAY EXAM THORAC SPINE 2VWS: CPT

## 2021-12-16 PROCEDURE — 3008F BODY MASS INDEX DOCD: CPT | Performed by: NEUROLOGICAL SURGERY

## 2021-12-16 PROCEDURE — 99213 OFFICE O/P EST LOW 20 MIN: CPT | Performed by: NEUROLOGICAL SURGERY
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
## 2022-03-09 ENCOUNTER — TELEPHONE (OUTPATIENT)
Dept: NEUROLOGY | Facility: CLINIC | Age: 62
End: 2022-03-09

## 2022-03-09 NOTE — TELEPHONE ENCOUNTER
Patient found a neurology to see him sooner   Wants to cancel his 6/9/22 appointment with Dr Filomena Villagomez

## 2022-03-31 ENCOUNTER — OFFICE VISIT (OUTPATIENT)
Dept: GASTROENTEROLOGY | Facility: CLINIC | Age: 62
End: 2022-03-31
Payer: COMMERCIAL

## 2022-03-31 ENCOUNTER — TELEPHONE (OUTPATIENT)
Dept: GASTROENTEROLOGY | Facility: CLINIC | Age: 62
End: 2022-03-31

## 2022-03-31 VITALS
HEIGHT: 70 IN | BODY MASS INDEX: 29.75 KG/M2 | WEIGHT: 207.8 LBS | DIASTOLIC BLOOD PRESSURE: 84 MMHG | SYSTOLIC BLOOD PRESSURE: 148 MMHG

## 2022-03-31 DIAGNOSIS — R13.19 ESOPHAGEAL DYSPHAGIA: ICD-10-CM

## 2022-03-31 DIAGNOSIS — K63.5 HYPERPLASTIC COLONIC POLYP, UNSPECIFIED PART OF COLON: ICD-10-CM

## 2022-03-31 DIAGNOSIS — K21.00 GASTROESOPHAGEAL REFLUX DISEASE WITH ESOPHAGITIS WITHOUT HEMORRHAGE: Primary | ICD-10-CM

## 2022-03-31 PROCEDURE — 99203 OFFICE O/P NEW LOW 30 MIN: CPT | Performed by: NURSE PRACTITIONER

## 2022-03-31 RX ORDER — ATORVASTATIN CALCIUM 20 MG/1
20 TABLET, FILM COATED ORAL DAILY
COMMUNITY
Start: 2022-03-17

## 2022-03-31 RX ORDER — OMEPRAZOLE 20 MG/1
20 CAPSULE, DELAYED RELEASE ORAL DAILY
COMMUNITY
Start: 2022-03-02 | End: 2022-05-19 | Stop reason: HOSPADM

## 2022-03-31 RX ORDER — CYCLOBENZAPRINE HCL 5 MG
TABLET ORAL
COMMUNITY
Start: 2022-03-04
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
## 2022-03-31 NOTE — TELEPHONE ENCOUNTER
Scheduled date of EGD(as of today):05/19/22  Physician performing EGD:Dr David Mcclellan of EGD:[facility]  Instructions reviewed with patient by:Gretchen Leyva  Clearances: No
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
## 2022-03-31 NOTE — PATIENT INSTRUCTIONS
GERD (Gastroesophageal Reflux Disease)   WHAT YOU NEED TO KNOW:   Gastroesophageal reflux disease (GERD) is reflux that happens more than 2 times a week for a few weeks  Reflux means acid and food in your stomach back up into your esophagus  GERD can cause other health problems over time if it is not treated  DISCHARGE INSTRUCTIONS:   Call your local emergency number (911 in the [address] [facility] located within [facility] [facility]) if:   · You have severe chest pain and sudden trouble breathing  Return to the emergency department if:   · You have trouble breathing after you vomit  · You have trouble swallowing, or pain with swallowing  · Your bowel movements are black, bloody, or tarry-looking  · Your vomit looks like coffee grounds or has blood in it  Call your doctor or gastroenterologist if:   · You feel full and cannot burp or vomit  · You vomit large amounts, or you vomit often  · You are losing weight without trying  · Your symptoms get worse or do not improve with treatment  · You have questions or concerns about your condition or care  Medicines:   · Medicines  are used to decrease stomach acid  Medicine may also be used to help your lower esophageal sphincter and stomach contract (tighten) more  · Take your medicine as directed  Contact your healthcare provider if you think your medicine is not helping or if you have side effects  Tell him of her if you are allergic to any medicine  Keep a list of the medicines, vitamins, and herbs you take  Include the amounts, and when and why you take them  Bring the list or the pill bottles to follow-up visits  Carry your medicine list with you in case of an emergency  Manage GERD:       · Do not have foods or drinks that may increase heartburn  These include chocolate, peppermint, fried or fatty foods, drinks that contain caffeine, or carbonated drinks (soda)  Other foods include spicy foods, onions, tomatoes, and tomato-based foods   Do not have foods or drinks that can irritate your esophagus, such as citrus fruits, juices, and alcohol  · Do not eat large meals  When you eat a lot of food at one time, your stomach needs more acid to digest it  Eat 6 small meals each day instead of 3 large ones, and eat slowly  Do not eat meals 2 to 3 hours before bedtime  · Elevate the head of your bed  Place 6-inch blocks under the head of your bed frame  You may also use more than one pillow under your head and shoulders while you sleep  · Maintain a healthy weight  If you are overweight, weight loss may help relieve symptoms of GERD  · Do not smoke  Smoking weakens the lower esophageal sphincter and increases the risk of GERD  Ask your healthcare provider for information if you currently smoke and need help to quit  E-cigarettes or smokeless tobacco still contain nicotine  Talk to your healthcare provider before you use these products  · Do not put pressure on your abdomen  Pressure pushes acid up into your esophagus  Do not wear clothing that is tight around your waist  Do not bend over  Bend at the knees if you need to pick something up  Follow up with your doctor or gastroenterologist as directed:  Write down your questions so you remember to ask them during your visits  © Copyright [employer] 2022 Information is for End User's use only and may not be sold, redistributed or otherwise used for commercial purposes  All illustrations and images included in CareNotes® are the copyrighted property of [employer]  or [employer]  The above information is an  only  It is not intended as medical advice for individual conditions or treatments  Talk to your doctor, nurse or pharmacist before following any medical regimen to see if it is safe and effective for you

## 2022-05-18 ENCOUNTER — ANESTHESIA EVENT (OUTPATIENT)
Dept: ANESTHESIOLOGY | Facility: AMBULATORY SURGERY CENTER | Age: 62
End: 2022-05-18

## 2022-05-18 ENCOUNTER — ANESTHESIA (OUTPATIENT)
Dept: ANESTHESIOLOGY | Facility: AMBULATORY SURGERY CENTER | Age: 62
End: 2022-05-18

## 2022-05-19 ENCOUNTER — ANESTHESIA (OUTPATIENT)
Dept: GASTROENTEROLOGY | Facility: AMBULATORY SURGERY CENTER | Age: 62
End: 2022-05-19

## 2022-05-19 ENCOUNTER — HOSPITAL ENCOUNTER (OUTPATIENT)
Dept: GASTROENTEROLOGY | Facility: AMBULATORY SURGERY CENTER | Age: 62
Discharge: HOME | End: 2022-05-19
Payer: COMMERCIAL

## 2022-05-19 ENCOUNTER — ANESTHESIA EVENT (OUTPATIENT)
Dept: GASTROENTEROLOGY | Facility: AMBULATORY SURGERY CENTER | Age: 62
End: 2022-05-19

## 2022-05-19 VITALS
BODY MASS INDEX: 29.35 KG/M2 | HEART RATE: 72 BPM | TEMPERATURE: 97.3 F | HEIGHT: 70 IN | WEIGHT: 205 LBS | SYSTOLIC BLOOD PRESSURE: 107 MMHG | RESPIRATION RATE: 16 BRPM | DIASTOLIC BLOOD PRESSURE: 66 MMHG | OXYGEN SATURATION: 95 %

## 2022-05-19 DIAGNOSIS — K21.00 GASTROESOPHAGEAL REFLUX DISEASE WITH ESOPHAGITIS WITHOUT HEMORRHAGE: ICD-10-CM

## 2022-05-19 DIAGNOSIS — R13.19 ESOPHAGEAL DYSPHAGIA: ICD-10-CM

## 2022-05-19 PROCEDURE — 43450 DILATE ESOPHAGUS 1/MULT PASS: CPT | Performed by: INTERNAL MEDICINE

## 2022-05-19 PROCEDURE — 88342 IMHCHEM/IMCYTCHM 1ST ANTB: CPT | Performed by: PATHOLOGY

## 2022-05-19 PROCEDURE — 43239 EGD BIOPSY SINGLE/MULTIPLE: CPT | Performed by: INTERNAL MEDICINE

## 2022-05-19 PROCEDURE — 88305 TISSUE EXAM BY PATHOLOGIST: CPT | Performed by: PATHOLOGY

## 2022-05-19 RX ORDER — SODIUM CHLORIDE, SODIUM LACTATE, POTASSIUM CHLORIDE, CALCIUM CHLORIDE 600; 310; 30; 20 MG/100ML; MG/100ML; MG/100ML; MG/100ML
50 INJECTION, SOLUTION INTRAVENOUS CONTINUOUS
Status: DISCONTINUED | OUTPATIENT
Start: 2022-05-19 | End: 2022-05-23 | Stop reason: HOSPADM

## 2022-05-19 RX ORDER — OMEPRAZOLE 40 MG/1
40 CAPSULE, DELAYED RELEASE ORAL DAILY
Qty: 30 CAPSULE | Refills: 2 | Status: SHIPPED | OUTPATIENT
Start: 2022-05-19

## 2022-05-19 RX ORDER — AMITRIPTYLINE HYDROCHLORIDE 25 MG/1
TABLET, FILM COATED ORAL
COMMUNITY
Start: 2022-05-17

## 2022-05-19 RX ORDER — PROPOFOL 10 MG/ML
INJECTION, EMULSION INTRAVENOUS AS NEEDED
Status: DISCONTINUED | OUTPATIENT
Start: 2022-05-19 | End: 2022-05-19

## 2022-05-19 RX ORDER — LIDOCAINE HYDROCHLORIDE 10 MG/ML
INJECTION, SOLUTION EPIDURAL; INFILTRATION; INTRACAUDAL; PERINEURAL AS NEEDED
Status: DISCONTINUED | OUTPATIENT
Start: 2022-05-19 | End: 2022-05-19

## 2022-05-19 RX ADMIN — PROPOFOL 50 MG: 10 INJECTION, EMULSION INTRAVENOUS at 08:01

## 2022-05-19 RX ADMIN — LIDOCAINE HYDROCHLORIDE 50 MG: 10 INJECTION, SOLUTION EPIDURAL; INFILTRATION; INTRACAUDAL; PERINEURAL at 07:59

## 2022-05-19 RX ADMIN — PROPOFOL 30 MG: 10 INJECTION, EMULSION INTRAVENOUS at 08:00

## 2022-05-19 RX ADMIN — SODIUM CHLORIDE, SODIUM LACTATE, POTASSIUM CHLORIDE, CALCIUM CHLORIDE 50 ML/HR: 600; 310; 30; 20 INJECTION, SOLUTION INTRAVENOUS at 07:49

## 2022-05-19 RX ADMIN — PROPOFOL 150 MG: 10 INJECTION, EMULSION INTRAVENOUS at 07:59

## 2022-05-19 NOTE — H&P
History and Physical - [facility]  Marva Booth 62 y o  male MRN: [id number]    HPI: Marva Booth is a 62y o  year old male who presents for GERD, dysphagia    REVIEW OF SYSTEMS: Per the HPI, and otherwise unremarkable      Historical Information   Past Medical History:   Diagnosis Date    Colon polyp     Concussion     Diverticulitis of colon     Dysphagia     Hyperlipidemia     Hypertension      Past Surgical History:   Procedure Laterality Date    APPENDECTOMY      COLON SIGMOID RESECTION      COLONOSCOPY      THORACIC FUSION       Social History   Social History     Substance and Sexual Activity   Alcohol Use Yes    Comment: occasional     Social History     Substance and Sexual Activity   Drug Use No     Social History     Tobacco Use   Smoking Status Former Smoker   Smokeless Tobacco Never Used     Family History   Problem Relation Age of Onset    Diabetes Mother     Hyperlipidemia Mother     Hypertension Mother     Heart failure Mother     Heart attack Father     Colon cancer Maternal Uncle     Multiple sclerosis Sister     Heart attack Brother     Diabetes Sister        Meds/Allergies       Current Outpatient Medications:     amitriptyline (ELAVIL) 25 mg tablet    atorvastatin (LIPITOR) 20 mg tablet    lisinopril (ZESTRIL) 5 mg tablet    omeprazole (PriLOSEC) 20 mg delayed release capsule    amoxicillin (AMOXIL) 500 mg capsule    cyclobenzaprine (FLEXERIL) 5 mg tablet    Current Facility-Administered Medications:     lactated ringers infusion, 50 mL/hr, Intravenous, Continuous, Continue from Pre-op at 05/19/22 0755    Allergies   Allergen Reactions    Flagyl [Metronidazole] Rash       Objective     /78   Pulse 73   Temp (!) 97 3 °F (36 3 °C) (Temporal)   Resp 16   Ht 5' 10" (1 778 m)   Wt 93 kg (205 lb)   SpO2 98%   BMI 29 41 kg/m²     PHYSICAL EXAM    Gen: NAD AAOx3  Head: Normocephalic, Atraumatic  CV: S1S2 RRR no m/r/g  CHEST: Clear b/l no c/r/w  ABD: soft, +BS NT/ND  EXT: no edema    ASSESSMENT/PLAN:  This is a 62y o  year old male here for EGD, and he is stable and optimized for his procedure

## 2022-05-19 NOTE — ANESTHESIA PREPROCEDURE EVALUATION
Procedure:  PRE-OP ONLY    Relevant Problems   GI/HEPATIC   (+) Esophageal dysphagia   (+) Gastroesophageal reflux disease with esophagitis             Anesthesia Plan  ASA Score- 2     Anesthesia Type- IV sedation with anesthesia with ASA Monitors  Additional Monitors:   Airway Plan:           Plan Factors-    Chart reviewed  Patient is not a current smoker  Patient not instructed to abstain from smoking on day of procedure  Patient did not smoke on day of surgery  Induction- intravenous  Postoperative Plan-     Informed Consent- Anesthetic plan and risks discussed with patient  I personally reviewed this patient with the CRNA  Discussed and agreed on the Anesthesia Plan with the CRNA  Bindu Mathews

## 2022-05-19 NOTE — ANESTHESIA POSTPROCEDURE EVALUATION
Post-Op Assessment Note    CV Status:  Stable  Pain Score: 0    Pain management: adequate     Mental Status:  Arousable and sleepy   Hydration Status:  Stable and euvolemic   PONV Controlled:  Controlled   Airway Patency:  Patent      Post Op Vitals Reviewed: Yes      Staff: CRNA         No complications documented      BP   103/60   Temp     Pulse  73   Resp   15   SpO2   93
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
## 2022-06-02 NOTE — RESULT ENCOUNTER NOTE
Discussed with patient, no EGD recall  Swallowing improving  Continue omeprazole 40 mg daily until office visit

## 2022-06-17 ENCOUNTER — OFFICE VISIT (OUTPATIENT)
Dept: NEUROSURGERY | Facility: CLINIC | Age: 62
End: 2022-06-17
Payer: COMMERCIAL

## 2022-06-17 ENCOUNTER — HOSPITAL ENCOUNTER (OUTPATIENT)
Dept: RADIOLOGY | Facility: HOSPITAL | Age: 62
Discharge: HOME | End: 2022-06-17
Attending: NEUROLOGICAL SURGERY
Payer: COMMERCIAL

## 2022-06-17 VITALS
SYSTOLIC BLOOD PRESSURE: 132 MMHG | HEIGHT: 70 IN | OXYGEN SATURATION: 98 % | WEIGHT: 205 LBS | HEART RATE: 76 BPM | BODY MASS INDEX: 29.35 KG/M2 | DIASTOLIC BLOOD PRESSURE: 79 MMHG | TEMPERATURE: 97.9 F | RESPIRATION RATE: 16 BRPM

## 2022-06-17 DIAGNOSIS — S22.009D CLOSED FRACTURE OF THORACIC SPINE WITHOUT SPINAL CORD LESION WITH ROUTINE HEALING, SUBSEQUENT ENCOUNTER: ICD-10-CM

## 2022-06-17 DIAGNOSIS — S22.009D CLOSED FRACTURE OF THORACIC SPINE WITHOUT SPINAL CORD LESION WITH ROUTINE HEALING, SUBSEQUENT ENCOUNTER: Primary | ICD-10-CM

## 2022-06-17 PROCEDURE — 72070 X-RAY EXAM THORAC SPINE 2VWS: CPT

## 2022-06-17 PROCEDURE — 99213 OFFICE O/P EST LOW 20 MIN: CPT | Performed by: NEUROLOGICAL SURGERY

## 2022-06-17 PROCEDURE — 3008F BODY MASS INDEX DOCD: CPT | Performed by: NEUROLOGICAL SURGERY

## 2022-06-17 RX ORDER — ATORVASTATIN CALCIUM 20 MG/1
TABLET, FILM COATED ORAL
COMMUNITY
Start: 2022-06-16

## 2022-06-17 RX ORDER — AMITRIPTYLINE HYDROCHLORIDE 25 MG/1
TABLET, FILM COATED ORAL
COMMUNITY
Start: 2022-05-17

## 2022-06-17 RX ORDER — OMEPRAZOLE 40 MG/1
CAPSULE, DELAYED RELEASE ORAL
COMMUNITY
Start: 2022-05-19
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
## 2022-07-12 ENCOUNTER — TELEPHONE (OUTPATIENT)
Dept: GASTROENTEROLOGY | Facility: CLINIC | Age: 62
End: 2022-07-12

## 2022-07-12 NOTE — TELEPHONE ENCOUNTER
Pt returning call stating he received a call regarding his insurance  Pt stated he will be using [employer] under [employer]

## 2022-07-25 ENCOUNTER — OFFICE VISIT (OUTPATIENT)
Dept: GASTROENTEROLOGY | Facility: CLINIC | Age: 62
End: 2022-07-25
Payer: COMMERCIAL

## 2022-07-25 VITALS
HEIGHT: 70 IN | BODY MASS INDEX: 30.52 KG/M2 | WEIGHT: 213.2 LBS | SYSTOLIC BLOOD PRESSURE: 110 MMHG | DIASTOLIC BLOOD PRESSURE: 68 MMHG

## 2022-07-25 DIAGNOSIS — K21.00 GASTROESOPHAGEAL REFLUX DISEASE WITH ESOPHAGITIS WITHOUT HEMORRHAGE: Primary | ICD-10-CM

## 2022-07-25 DIAGNOSIS — K63.5 HYPERPLASTIC COLONIC POLYP, UNSPECIFIED PART OF COLON: ICD-10-CM

## 2022-07-25 PROCEDURE — 99213 OFFICE O/P EST LOW 20 MIN: CPT | Performed by: REGISTERED NURSE

## 2022-07-25 NOTE — PROGRESS NOTES
[facility] - Outpatient Follow-up Note  Aydin Corbett 62 y o  male MRN: [id number]  Encounter: [id number]    ASSESSMENT AND PLAN:      1  Gastroesophageal reflux disease with esophagitis without hemorrhage  Symptoms well controlled with omeprazole 40 mg daily  Status post EGD 5/19 with a stricture noted otherwise normal   He will continue 40 mg of omeprazole for the next month and then wean to 20 mg until follow-up appointment  2  Hyperplastic colonic polyp, unspecified part of colon  Previous colonoscopy 2017 with a 10 year recall  Next colonoscopy due 2027  Followup Appointment:  4 months  ______________________________________________________________________    Chief Complaint   Patient presents with    GERD     Follow up     HPI:  15-year-old male presents for follow-up on dysphagia and GERD  He had an EGD with dilatation  No more symptoms of dysphagia  GERD symptoms well controlled on omeprazole 40 mg  Denies any breakthrough symptoms  He was having intermittent constipation and told to take MiraLax  He is back to moving his bowels daily or every other day  If he has not had a bowel movement on day 3 he will take the MiraLax  He does admit to some abdominal bloating which is chronic  We did discuss try a trial of ib Guard      Historical Information   Past Medical History:   Diagnosis Date    Colon polyp     Concussion     Diverticulitis of colon     Dysphagia     Hyperlipidemia     Hypertension      Past Surgical History:   Procedure Laterality Date    APPENDECTOMY      COLON SIGMOID RESECTION      COLONOSCOPY      THORACIC FUSION       Social History     Substance and Sexual Activity   Alcohol Use Yes    Comment: occasional     Social History     Substance and Sexual Activity   Drug Use No     Social History     Tobacco Use   Smoking Status Former Smoker   Smokeless Tobacco Never Used     Family History   Problem Relation Age of Onset    Diabetes Mother     Hyperlipidemia Mother     Hypertension Mother     Heart failure Mother     Heart attack Father     Colon cancer Maternal Uncle     Multiple sclerosis Sister     Heart attack Brother     Diabetes Sister          Current Outpatient Medications:     amitriptyline (ELAVIL) 25 mg tablet    atorvastatin (LIPITOR) 20 mg tablet    lisinopril (ZESTRIL) 5 mg tablet    omeprazole (PriLOSEC) 40 MG capsule    cyclobenzaprine (FLEXERIL) 5 mg tablet  Allergies   Allergen Reactions    Flagyl [Metronidazole] Rash     Reviewed medications and allergies and updated as indicated    PHYSICAL EXAM:    Blood pressure 110/68, height 5' 10" (1 778 m), weight 96 7 kg (213 lb 3 2 oz)  Body mass index is 30 59 kg/m²  General Appearance: NAD, cooperative, alert  Eyes: Anicteric, PERRLA, EOMI  ENT:  Normocephalic, atraumatic, normal mucosa  Neck:  Supple, symmetrical, trachea midline  Resp:  Clear to auscultation bilaterally; no rales, rhonchi or wheezing; respirations unlabored   CV:  S1 S2, Regular rate and rhythm; no murmur, rub, or gallop  GI:  Soft, non-tender, non-distended; normal bowel sounds; no masses, no organomegaly   Rectal: Deferred  Musculoskeletal: No cyanosis, clubbing or edema  Normal ROM  Skin:  No jaundice, rashes, or lesions   Heme/Lymph: No palpable cervical lymphadenopathy  Psych: Normal affect, good eye contact  Neuro: No gross deficits, AAOx3    Lab Results:   No results found for: WBC, HGB, HCT, MCV, PLT  No results found for: NA, K, CL, CO2, ANIONGAP, BUN, CREATININE, GLUCOSE, GLUF, CALCIUM, CORRECTEDCA, AST, ALT, ALKPHOS, PROT, BILITOT, EGFR  No results found for: IRON, TIBC, FERRITIN  No results found for: LIPASE    Radiology Results:   No results found

## 2022-08-17 DIAGNOSIS — K21.00 GASTROESOPHAGEAL REFLUX DISEASE WITH ESOPHAGITIS WITHOUT HEMORRHAGE: ICD-10-CM

## 2022-08-17 RX ORDER — OMEPRAZOLE 40 MG/1
40 CAPSULE, DELAYED RELEASE ORAL DAILY
Qty: 90 CAPSULE | Refills: 0 | Status: SHIPPED | OUTPATIENT
Start: 2022-08-17

## 2022-11-18 ENCOUNTER — OFFICE VISIT (OUTPATIENT)
Dept: GASTROENTEROLOGY | Facility: CLINIC | Age: 62
End: 2022-11-18

## 2022-11-18 VITALS
DIASTOLIC BLOOD PRESSURE: 78 MMHG | BODY MASS INDEX: 30.64 KG/M2 | SYSTOLIC BLOOD PRESSURE: 120 MMHG | WEIGHT: 214 LBS | HEIGHT: 70 IN

## 2022-11-18 DIAGNOSIS — K59.04 CHRONIC IDIOPATHIC CONSTIPATION: ICD-10-CM

## 2022-11-18 DIAGNOSIS — K21.00 GASTROESOPHAGEAL REFLUX DISEASE WITH ESOPHAGITIS WITHOUT HEMORRHAGE: Primary | ICD-10-CM

## 2022-11-18 DIAGNOSIS — Z12.11 COLON CANCER SCREENING: ICD-10-CM

## 2022-11-18 DIAGNOSIS — R13.19 ESOPHAGEAL DYSPHAGIA: ICD-10-CM

## 2022-11-18 RX ORDER — OMEPRAZOLE 20 MG/1
20 CAPSULE, DELAYED RELEASE ORAL DAILY
Qty: 90 CAPSULE | Refills: 4 | Status: SHIPPED | OUTPATIENT
Start: 2022-11-18

## 2022-11-18 RX ORDER — DOCUSATE SODIUM 100 MG/1
200 CAPSULE, LIQUID FILLED ORAL
Qty: 60 CAPSULE | Refills: 2 | Status: SHIPPED | OUTPATIENT
Start: 2022-11-18

## 2022-11-18 NOTE — PROGRESS NOTES
[facility] - Outpatient Follow-up Note  Naomi Varela 65 y o  male MRN: [id number]  Encounter: [id number]    ASSESSMENT AND PLAN:      1  Gastroesophageal reflux disease with esophagitis without hemorrhage  62M here today for f/u  Was able to drop PPI to 20mg daily but could not completely come off  Stable on 20mg daily  No more dysphagia  EGD neg for Barretts  - GERD lifestyle modifcations  - omeprazole (PriLOSEC) 20 mg delayed release capsule; Take 1 capsule (20 mg total) by mouth daily  Dispense: 90 capsule; Refill: 4    2  Esophageal dysphagia  Resolved after dilation to 54Fr    3  Chronic idiopathic constipation  miralax did not work  Causes too much bloating and pasty stools  Will try some colace and add dietary fiber, and def incr water intake  - docusate sodium (COLACE) 100 mg capsule; Take 2 capsules (200 mg total) by mouth daily at bedtime  Dispense: 60 capsule; Refill: 2    4  Colon cancer screening  [facility], recall 2/2027      Followup Appointment: 3 months  ______________________________________________________________________    Chief Complaint   Patient presents with   • Follow up-GERD     HPI:  62-year-old male here today for follow-up of his dysphagia and reflux  Was able to get his omeprazole down to 20 mg daily but really unable to drop off much further than that  EGD reviewed  Overall feels omeprazole 20 mg is good  No dysphagia, nausea vomiting  No unintentional weight loss  Today he also complains of some constipation  He goes from being constipated for several days and then can get very loose bowel movements for couple days  Often requires MiraLax, but this does not completely resolve his symptoms  He does move his bowels but can be hard  Has a history of sigmoid resection for diverticulitis      Historical Information   Past Medical History:   Diagnosis Date   • Colon polyp    • Concussion    • Diverticulitis of colon    • Dysphagia    • Hyperlipidemia    • Hypertension      Past Surgical History:   Procedure Laterality Date   • APPENDECTOMY     • COLON SIGMOID RESECTION     • COLONOSCOPY     • THORACIC FUSION       Social History     Substance and Sexual Activity   Alcohol Use Yes    Comment: occasional     Social History     Substance and Sexual Activity   Drug Use No     Social History     Tobacco Use   Smoking Status Former   Smokeless Tobacco Never     Family History   Problem Relation Age of Onset   • Diabetes Mother    • Hyperlipidemia Mother    • Hypertension Mother    • Heart failure Mother    • Heart attack Father    • Colon cancer Maternal Uncle    • Multiple sclerosis Sister    • Heart attack Brother    • Diabetes Sister          Current Outpatient Medications:   •  atorvastatin (LIPITOR) 20 mg tablet  •  docusate sodium (COLACE) 100 mg capsule  •  lisinopril (ZESTRIL) 5 mg tablet  •  omeprazole (PriLOSEC) 20 mg delayed release capsule  Allergies   Allergen Reactions   • Flagyl [Metronidazole] Rash     Reviewed medications and allergies and updated as indicated    PHYSICAL EXAM:    Blood pressure 120/78, height 5' 10" (1 778 m), weight 97 1 kg (214 lb)  Body mass index is 30 71 kg/m²  General Appearance: NAD, cooperative, alert  Eyes: Anicteric, PERRLA, EOMI  ENT:  Normocephalic, atraumatic, normal mucosa  Neck:  Supple, symmetrical, trachea midline  Resp:  Clear to auscultation bilaterally; no rales, rhonchi or wheezing; respirations unlabored   CV:  S1 S2, Regular rate and rhythm; no murmur, rub, or gallop  GI:  Soft, non-tender, non-distended; normal bowel sounds; no masses, no organomegaly   Rectal: Deferred  Musculoskeletal: No cyanosis, clubbing or edema  Normal ROM    Skin:  No jaundice, rashes, or lesions   Heme/Lymph: No palpable cervical lymphadenopathy  Psych: Normal affect, good eye contact  Neuro: No gross deficits, AAOx3    Lab Results:   No results found for: WBC, HGB, HCT, MCV, PLT  No results found for: NA, K, CL, CO2, ANIONGAP, BUN, CREATININE, GLUCOSE, GLUF, CALCIUM, CORRECTEDCA, AST, ALT, ALKPHOS, PROT, BILITOT, EGFR  No results found for: IRON, TIBC, FERRITIN  No results found for: LIPASE    Radiology Results:   No results found

## 2023-02-21 ENCOUNTER — OFFICE VISIT (OUTPATIENT)
Dept: GASTROENTEROLOGY | Facility: CLINIC | Age: 63
End: 2023-02-21

## 2023-02-21 VITALS
SYSTOLIC BLOOD PRESSURE: 134 MMHG | WEIGHT: 221 LBS | DIASTOLIC BLOOD PRESSURE: 77 MMHG | BODY MASS INDEX: 31.64 KG/M2 | HEIGHT: 70 IN

## 2023-02-21 DIAGNOSIS — K21.00 GASTROESOPHAGEAL REFLUX DISEASE WITH ESOPHAGITIS WITHOUT HEMORRHAGE: ICD-10-CM

## 2023-02-21 DIAGNOSIS — Z12.11 COLON CANCER SCREENING: ICD-10-CM

## 2023-02-21 DIAGNOSIS — R13.19 ESOPHAGEAL DYSPHAGIA: ICD-10-CM

## 2023-02-21 DIAGNOSIS — K59.04 CHRONIC IDIOPATHIC CONSTIPATION: Primary | ICD-10-CM

## 2023-02-21 RX ORDER — OMEPRAZOLE 20 MG/1
20 CAPSULE, DELAYED RELEASE ORAL DAILY
Qty: 90 CAPSULE | Refills: 4 | Status: SHIPPED | OUTPATIENT
Start: 2023-02-21

## 2023-02-21 RX ORDER — DOCUSATE SODIUM 100 MG/1
300 CAPSULE, LIQUID FILLED ORAL
Qty: 60 CAPSULE | Refills: 2
Start: 2023-02-21

## 2023-02-21 NOTE — PROGRESS NOTES
[id number] - Outpatient Follow-up Note  Dayana Holloway 65 y o  male MRN: [id number]  Encounter: [id number]    ASSESSMENT AND PLAN:      1  Chronic idiopathic constipation  81-year-old male here today for follow-up  MiraLAX did not work  Was doing well on the Colace 200 mg at night until recently, and started to take 3 which helped  However for the past 4 days, feels very backed up     -High-fiber increased water intake  -Do a trial of Dulcolax for the next 1 to 3 days to help start the bowel movements  I advised patient to give me a call if the Dulcolax does not help  May need to go back on the MiraLAX temporarily     -Continue docusate sodium (COLACE) 100 mg capsule; Take 3 capsules (300 mg total) by mouth daily at bedtime  Dispense: 60 capsule; Refill: 2  - bisacodyl (DULCOLAX) 5 mg EC tablet; Take 2 tablets (10 mg total) by mouth daily as needed for constipation for up to 3 days  Dispense: 30 tablet; Refill: 0    - CBC and differential  - TSH, 3rd generation with Free T4 reflex    2  Gastroesophageal reflux disease with esophagitis without hemorrhage  Well-controlled on low-dose omeprazole  No more dysphagia  EGD negative for Corral's  - omeprazole (PriLOSEC) 20 mg delayed release capsule; Take 1 capsule (20 mg total) by mouth daily  Dispense: 90 capsule; Refill: 4  -Lifestyle modifications  - Comprehensive metabolic panel    3  Esophageal dysphagia    4  Colon cancer screening  Up-to-date, recall February 2027      Followup Appointment: 6 months  ______________________________________________________________________    Chief Complaint   Patient presents with   • GERD   • Follow up stretching of esophagus     HPI: 81-year-old male here today for follow-up  Upper GI symptoms of reflux and dysphagia improved  His only complaint is that his constipation has been a little rough for the past 4 days    He did increase the Colace to 300 mg daily which was helping for months  However, the past week, he feels very backed up  Bloated  No bleeding  Weight is stable  Historical Information   Past Medical History:   Diagnosis Date   • Colon polyp    • Concussion    • Diverticulitis of colon    • Dysphagia    • Hyperlipidemia    • Hypertension      Past Surgical History:   Procedure Laterality Date   • APPENDECTOMY     • COLON SIGMOID RESECTION     • COLONOSCOPY     • THORACIC FUSION       Social History     Substance and Sexual Activity   Alcohol Use Yes    Comment: occasional     Social History     Substance and Sexual Activity   Drug Use No     Social History     Tobacco Use   Smoking Status Former   Smokeless Tobacco Never     Family History   Problem Relation Age of Onset   • Diabetes Mother    • Hyperlipidemia Mother    • Hypertension Mother    • Heart failure Mother    • Heart attack Father    • Colon cancer Maternal Uncle    • Multiple sclerosis Sister    • Heart attack Brother    • Diabetes Sister          Current Outpatient Medications:   •  atorvastatin (LIPITOR) 20 mg tablet  •  bisacodyl (DULCOLAX) 5 mg EC tablet  •  docusate sodium (COLACE) 100 mg capsule  •  lisinopril (ZESTRIL) 5 mg tablet  •  omeprazole (PriLOSEC) 20 mg delayed release capsule  Allergies   Allergen Reactions   • Flagyl [Metronidazole] Rash     Reviewed medications and allergies and updated as indicated    PHYSICAL EXAM:    Blood pressure 134/77, height 5' 10" (1 778 m), weight 100 kg (221 lb)  Body mass index is 31 71 kg/m²  General Appearance: NAD, cooperative, alert  Eyes: Anicteric, PERRLA, EOMI  ENT:  Normocephalic, atraumatic, normal mucosa  Neck:  Supple, symmetrical, trachea midline  Resp:  Clear to auscultation bilaterally; no rales, rhonchi or wheezing; respirations unlabored   CV:  S1 S2, Regular rate and rhythm; no murmur, rub, or gallop    GI:  Soft, non-tender, non-distended; normal bowel sounds; no masses, no organomegaly   Rectal: Deferred  Musculoskeletal: No cyanosis, clubbing or edema  Normal ROM    Skin:  No jaundice, rashes, or lesions   Heme/Lymph: No palpable cervical lymphadenopathy  Psych: Normal affect, good eye contact  Neuro: No gross deficits, AAOx3

## 2023-03-09 LAB
ALBUMIN SERPL-MCNC: 4.8 G/DL (ref 3.8–4.8)
ALBUMIN/GLOB SERPL: 1.8 {RATIO} (ref 1.2–2.2)
ALP SERPL-CCNC: 79 IU/L (ref 44–121)
ALT SERPL-CCNC: 37 IU/L (ref 0–44)
AST SERPL-CCNC: 31 IU/L (ref 0–40)
BASOPHILS # BLD AUTO: 0.1 X10E3/UL (ref 0–0.2)
BASOPHILS NFR BLD AUTO: 1 %
BILIRUB SERPL-MCNC: 0.7 MG/DL (ref 0–1.2)
BUN SERPL-MCNC: 14 MG/DL (ref 8–27)
BUN/CREAT SERPL: 12 (ref 10–24)
CALCIUM SERPL-MCNC: 9.7 MG/DL (ref 8.6–10.2)
CHLORIDE SERPL-SCNC: 99 MMOL/L (ref 96–106)
CO2 SERPL-SCNC: 24 MMOL/L (ref 20–29)
CREAT SERPL-MCNC: 1.17 MG/DL (ref 0.76–1.27)
EGFR: 71 ML/MIN/1.73
EOSINOPHIL # BLD AUTO: 0.2 X10E3/UL (ref 0–0.4)
EOSINOPHIL NFR BLD AUTO: 3 %
ERYTHROCYTE [DISTWIDTH] IN BLOOD BY AUTOMATED COUNT: 13 % (ref 11.6–15.4)
GLOBULIN SER-MCNC: 2.6 G/DL (ref 1.5–4.5)
GLUCOSE SERPL-MCNC: 94 MG/DL (ref 70–99)
HCT VFR BLD AUTO: 46.3 % (ref 37.5–51)
HGB BLD-MCNC: 15.9 G/DL (ref 13–17.7)
IMM GRANULOCYTES # BLD: 0 X10E3/UL (ref 0–0.1)
IMM GRANULOCYTES NFR BLD: 0 %
LYMPHOCYTES # BLD AUTO: 2.1 X10E3/UL (ref 0.7–3.1)
LYMPHOCYTES NFR BLD AUTO: 30 %
MCH RBC QN AUTO: 31.5 PG (ref 26.6–33)
MCHC RBC AUTO-ENTMCNC: 34.3 G/DL (ref 31.5–35.7)
MCV RBC AUTO: 92 FL (ref 79–97)
MONOCYTES # BLD AUTO: 0.6 X10E3/UL (ref 0.1–0.9)
MONOCYTES NFR BLD AUTO: 8 %
NEUTROPHILS # BLD AUTO: 4.1 X10E3/UL (ref 1.4–7)
NEUTROPHILS NFR BLD AUTO: 58 %
PLATELET # BLD AUTO: 227 X10E3/UL (ref 150–450)
POTASSIUM SERPL-SCNC: 4.8 MMOL/L (ref 3.5–5.2)
PROT SERPL-MCNC: 7.4 G/DL (ref 6–8.5)
RBC # BLD AUTO: 5.05 X10E6/UL (ref 4.14–5.8)
SODIUM SERPL-SCNC: 138 MMOL/L (ref 134–144)
TSH SERPL DL<=0.005 MIU/L-ACNC: 1.85 UIU/ML (ref 0.45–4.5)
WBC # BLD AUTO: 7.1 X10E3/UL (ref 3.4–10.8)

## 2023-04-22 PROBLEM — Z12.11 COLON CANCER SCREENING: Status: RESOLVED | Noted: 2023-02-21 | Resolved: 2023-04-22

## 2023-08-08 ENCOUNTER — OFFICE VISIT (OUTPATIENT)
Dept: GASTROENTEROLOGY | Facility: CLINIC | Age: 63
End: 2023-08-08
Payer: COMMERCIAL

## 2023-08-08 VITALS
SYSTOLIC BLOOD PRESSURE: 120 MMHG | BODY MASS INDEX: 30.61 KG/M2 | WEIGHT: 213.8 LBS | HEIGHT: 70 IN | DIASTOLIC BLOOD PRESSURE: 78 MMHG

## 2023-08-08 DIAGNOSIS — K21.00 GASTROESOPHAGEAL REFLUX DISEASE WITH ESOPHAGITIS WITHOUT HEMORRHAGE: ICD-10-CM

## 2023-08-08 DIAGNOSIS — K59.04 CHRONIC IDIOPATHIC CONSTIPATION: Primary | ICD-10-CM

## 2023-08-08 PROCEDURE — 99214 OFFICE O/P EST MOD 30 MIN: CPT | Performed by: INTERNAL MEDICINE

## 2023-08-08 RX ORDER — BISACODYL 5 MG/1
10 TABLET, DELAYED RELEASE ORAL DAILY PRN
Qty: 30 TABLET | Refills: 0 | Status: SHIPPED | OUTPATIENT
Start: 2023-08-08

## 2023-08-08 RX ORDER — OMEPRAZOLE 20 MG/1
20 CAPSULE, DELAYED RELEASE ORAL DAILY
Qty: 90 CAPSULE | Refills: 4 | Status: SHIPPED | OUTPATIENT
Start: 2023-08-08

## 2023-08-08 NOTE — PROGRESS NOTES
[facility] - Outpatient Follow-up Note  Angie Wylie 65 y.o. male MRN: [id number]  Encounter: [id number]    ASSESSMENT AND PLAN:      1. Chronic idiopathic constipation  Patient seems to be doing well overall, but never did  the bisacodyl that was prescribed last time. I represcribed it for him for the times he feels particularly backed up and bloated and needs to evacuate. Wondering if his surgical anastomosis is playing a role in his inability to completely evacuate  - bisacodyl (DULCOLAX) 5 mg EC tablet; Take 2 tablets (10 mg total) by mouth daily as needed for constipation  Dispense: 30 tablet; Refill: 0    2. Gastroesophageal reflux disease with esophagitis without hemorrhage  Because he has had a complication of his reflux disease and his peptic stricture, I told him that he will need omeprazole indefinitely. I have prescribed refills for him  - omeprazole (PriLOSEC) 20 mg delayed release capsule; Take 1 capsule (20 mg total) by mouth daily  Dispense: 90 capsule; Refill: 4      Follow up appointment: For 2025 screening colonoscopy  ______________________________________________________________________    Chief Complaint   Patient presents with   • Follow-up     Pt states the doxycycline he was taken caused constipation and heartburn symptoms. Meds have been finished for a month but still experiencing constipation, heartburn has resolved. HPI:   Patient is a 65 y.o. male with a significant PMH of GERD with peptic stricture and constipation presenting for follow up regarding the same issues. He did try to stop his omeprazole at some point, but his heartburn came right back immediately. He denies any further dysphagia since the dilation of his peptic stricture. He does not smoke. He is also using 300 mg of Colace daily to help move his bowels.   He states that it generally works okay, but recently he was treated for Lyme disease after tick bite and his constipation worsened. At last visit,  prescribed him bisacodyl, but he never received it. He does not see blood or mucus in the stool. He is up-to-date on screening colonoscopy. Historical Information   Past Medical History:   Diagnosis Date   • Colon polyp    • Concussion    • Diverticulitis of colon    • Dysphagia    • Hyperlipidemia    • Hypertension      Past Surgical History:   Procedure Laterality Date   • APPENDECTOMY     • COLON SIGMOID RESECTION     • COLONOSCOPY     • THORACIC FUSION       Social History     Substance and Sexual Activity   Alcohol Use Yes    Comment: occasional     Social History     Substance and Sexual Activity   Drug Use No     Social History     Tobacco Use   Smoking Status Former   Smokeless Tobacco Never     Family History   Problem Relation Age of Onset   • Diabetes Mother    • Hyperlipidemia Mother    • Hypertension Mother    • Heart failure Mother    • Heart attack Father    • Colon cancer Maternal Uncle    • Multiple sclerosis Sister    • Heart attack Brother    • Diabetes Sister          Current Outpatient Medications:   •  atorvastatin (LIPITOR) 20 mg tablet  •  bisacodyl (DULCOLAX) 5 mg EC tablet  •  docusate sodium (COLACE) 100 mg capsule  •  lisinopril (ZESTRIL) 5 mg tablet  •  omeprazole (PriLOSEC) 20 mg delayed release capsule  Allergies   Allergen Reactions   • Flagyl [Metronidazole] Rash     Reviewed medications and allergies and updated as indicated    PHYSICAL EXAM:    Blood pressure 120/78, height 5' 10" (1.778 m), weight 97 kg (213 lb 12.8 oz). Body mass index is 30.68 kg/m². General Appearance: NAD, cooperative, alert  Eyes: Anicteric  GI:  Soft, non-tender, non-distended; normal bowel sounds; no masses, no organomegaly   Rectal: Deferred  Musculoskeletal: No edema.   Skin:  No jaundice    Lab Results:   Lab Results   Component Value Date    WBC 7.1 03/08/2023    HGB 15.9 03/08/2023    MCV 92 03/08/2023     03/08/2023     Lab Results   Component Value Date    K 4.8 03/08/2023    CL 99 03/08/2023    CO2 24 03/08/2023    BUN 14 03/08/2023    CREATININE 1.17 03/08/2023    AST 31 03/08/2023    ALT 37 03/08/2023    EGFR 71 03/08/2023     No results found for: "IRON", "TIBC", "FERRITIN"  No results found for: "LIPASE"    Radiology Results:   No results found.

## 2024-09-20 DIAGNOSIS — K21.00 GASTROESOPHAGEAL REFLUX DISEASE WITH ESOPHAGITIS WITHOUT HEMORRHAGE: ICD-10-CM

## 2025-07-23 ENCOUNTER — DOCUMENTATION (OUTPATIENT)
Dept: ADMINISTRATIVE | Facility: OTHER | Age: 65
End: 2025-07-23

## 2025-07-23 NOTE — PROGRESS NOTES
07/23/25 4:09 PM    Annual Wellness Visit outreach is not required, patient has an upcoming appointment with the PCP office.    Thank you.  Teena Simons  [facility]

## (undated) DEVICE — DRAPE LARGE REVERSE FOLD

## (undated) DEVICE — SUTURE VICRYL 2-0  J762D CR CP-2 18IN

## (undated) DEVICE — SOLN IRRIG .9%SOD 1000ML

## (undated) DEVICE — PAD GROUND ELECTROSURGICAL W/CORD

## (undated) DEVICE — PENCIL ESU HANDSWITCHING W/HOL

## (undated) DEVICE — ***USE 141020*** PACK BASIC V

## (undated) DEVICE — GOWN SURG X-LARGE MICROCOOL

## (undated) DEVICE — ***USE 138549*** SUTURE VICRYL 0 J701D CR MO-4 18IN VIOLET

## (undated) DEVICE — DRESSING TEGADERM HYDROCOLLOID 4INX4IN

## (undated) DEVICE — MANIFOLD SINGLE PORT NEPTUNE

## (undated) DEVICE — CORD BI-POLAR DISP STERILE

## (undated) DEVICE — MANIFOLD FOUR PORT NEPTUNE

## (undated) DEVICE — NEEDLE DISP HYPO 22GX1-1/2IN

## (undated) DEVICE — PENEVAC1 NONSTICK SMOKE EVAC

## (undated) DEVICE — DRESSING SPONGE GAUZE 4X4 STER

## (undated) DEVICE — SPONGE SURGIFOAM ABSORBABLE GELATIN 100 8.5CM X 12CM X 10MM

## (undated) DEVICE — APPLICATOR CHLORAPREP 26ML ORANGE TINT

## (undated) DEVICE — DRAPE C-ARMOR

## (undated) DEVICE — RESERVOIR DRAIN 100ML

## (undated) DEVICE — TIP BOVIE BLADE COATED 3IN E1450G

## (undated) DEVICE — SUTURE VICRYL 3-0  J864D CR SH UNDYED

## (undated) DEVICE — GLOVE SZ 8 LINER PROTEXIS PI BL

## (undated) DEVICE — SUTURE BONE WAX   W31G

## (undated) DEVICE — GLOVE SZ 8 PROTEXIS CLASSIC LATEX

## (undated) DEVICE — Device

## (undated) DEVICE — STAPLER SKIN

## (undated) DEVICE — SUTURE VICRYL 4-0 J496H 18IN

## (undated) DEVICE — DRAPE C-ARM X-RAY EQUIPMENT IMAGE

## (undated) DEVICE — MASTISOL LIQUID ADHESIVE

## (undated) DEVICE — FORCEP TIP ISOCOOL 8.5L 3.5WL .5MM

## (undated) DEVICE — ***USE 57698*** SLEEVE FLOWTRON DVT CALF SINGLE USE

## (undated) DEVICE — SPINE TABLE COVER ULTRA COMFORT MEDIUM

## (undated) DEVICE — BURR LONG MIDAS AM-8

## (undated) DEVICE — SUTURE ETHILON  3-0   663H

## (undated) DEVICE — ***USE 59083*** SUTURE ETHIBOND 0 CX21D

## (undated) DEVICE — PACK RFID LAMINECTOMY PMH

## (undated) DEVICE — DRESSING SPONGE ALL GAUZE 4X4 STER 10PK